# Patient Record
Sex: FEMALE | Race: WHITE | Employment: FULL TIME | URBAN - METROPOLITAN AREA
[De-identification: names, ages, dates, MRNs, and addresses within clinical notes are randomized per-mention and may not be internally consistent; named-entity substitution may affect disease eponyms.]

---

## 2017-01-18 ENCOUNTER — ED HISTORICAL/CONVERTED ENCOUNTER (OUTPATIENT)
Dept: OTHER | Age: 52
End: 2017-01-18

## 2017-08-04 ENCOUNTER — ED HISTORICAL/CONVERTED ENCOUNTER (OUTPATIENT)
Dept: OTHER | Age: 52
End: 2017-08-04

## 2017-08-14 ENCOUNTER — ED HISTORICAL/CONVERTED ENCOUNTER (OUTPATIENT)
Dept: OTHER | Age: 52
End: 2017-08-14

## 2018-09-09 ENCOUNTER — ED HISTORICAL/CONVERTED ENCOUNTER (OUTPATIENT)
Dept: OTHER | Age: 53
End: 2018-09-09

## 2019-08-11 ENCOUNTER — ED HISTORICAL/CONVERTED ENCOUNTER (OUTPATIENT)
Dept: OTHER | Age: 54
End: 2019-08-11

## 2019-08-19 ENCOUNTER — ED HISTORICAL/CONVERTED ENCOUNTER (OUTPATIENT)
Dept: OTHER | Age: 54
End: 2019-08-19

## 2019-12-15 ENCOUNTER — ED HISTORICAL/CONVERTED ENCOUNTER (OUTPATIENT)
Dept: OTHER | Age: 54
End: 2019-12-15

## 2021-01-03 ENCOUNTER — HOSPITAL ENCOUNTER (EMERGENCY)
Age: 56
Discharge: HOME | End: 2021-01-03
Attending: EMERGENCY MEDICINE
Payer: MEDICAID

## 2021-01-03 VITALS
WEIGHT: 147 LBS | TEMPERATURE: 98.4 F | RESPIRATION RATE: 18 BRPM | DIASTOLIC BLOOD PRESSURE: 69 MMHG | HEART RATE: 61 BPM | HEIGHT: 65 IN | OXYGEN SATURATION: 98 % | BODY MASS INDEX: 24.49 KG/M2 | SYSTOLIC BLOOD PRESSURE: 107 MMHG

## 2021-01-03 DIAGNOSIS — R31.9 URINARY TRACT INFECTION WITH HEMATURIA, SITE UNSPECIFIED: Primary | ICD-10-CM

## 2021-01-03 DIAGNOSIS — N39.0 URINARY TRACT INFECTION WITH HEMATURIA, SITE UNSPECIFIED: Primary | ICD-10-CM

## 2021-01-03 LAB
APPEARANCE UR: CLEAR
BACTERIA URNS QL MICRO: ABNORMAL /HPF
BILIRUB UR QL: NEGATIVE
COLOR UR: YELLOW
EPITH CASTS URNS QL MICRO: ABNORMAL /LPF
GLUCOSE UR STRIP.AUTO-MCNC: NEGATIVE MG/DL
HGB UR QL STRIP: ABNORMAL
KETONES UR QL STRIP.AUTO: NEGATIVE MG/DL
LEUKOCYTE ESTERASE UR QL STRIP.AUTO: ABNORMAL
NITRITE UR QL STRIP.AUTO: NEGATIVE
PH UR STRIP: 5 [PH] (ref 5–8)
PROT UR STRIP-MCNC: NEGATIVE MG/DL
RBC #/AREA URNS HPF: ABNORMAL /HPF (ref 0–5)
SP GR UR REFRACTOMETRY: 1.02 (ref 1–1.03)
UA: UC IF INDICATED,UAUC: ABNORMAL
UROBILINOGEN UR QL STRIP.AUTO: 1 EU/DL (ref 0.2–1)
WBC URNS QL MICRO: ABNORMAL /HPF (ref 0–4)

## 2021-01-03 PROCEDURE — 99283 EMERGENCY DEPT VISIT LOW MDM: CPT

## 2021-01-03 PROCEDURE — 81001 URINALYSIS AUTO W/SCOPE: CPT

## 2021-01-03 RX ORDER — SULFAMETHOXAZOLE AND TRIMETHOPRIM 800; 160 MG/1; MG/1
1 TABLET ORAL 2 TIMES DAILY
Qty: 20 TAB | Refills: 0 | Status: SHIPPED | OUTPATIENT
Start: 2021-01-03 | End: 2021-01-13

## 2021-01-03 RX ORDER — PHENAZOPYRIDINE HYDROCHLORIDE 200 MG/1
200 TABLET, FILM COATED ORAL 3 TIMES DAILY
Qty: 9 TAB | Refills: 0 | Status: SHIPPED | OUTPATIENT
Start: 2021-01-03 | End: 2021-01-06

## 2021-01-03 NOTE — ED PROVIDER NOTES
EMERGENCY DEPARTMENT HISTORY AND PHYSICAL EXAM      Date: 1/3/2021  Patient Name: Christina Nelson    History of Presenting Illness     Chief Complaint   Patient presents with    Bladder Infection       History Provided By: Patient    HPI: Christina Nelson, 50 y.o. female with a past medical history significant No significant past medical history presents to the ED with cc of Dysuria 1 week    There are no other complaints, changes, or physical findings at this time. PCP: None    No current facility-administered medications on file prior to encounter. No current outpatient medications on file prior to encounter. Past History     Past Medical History:  History reviewed. No pertinent past medical history. Past Surgical History:  History reviewed. No pertinent surgical history. Family History:  History reviewed. No pertinent family history. Social History:  Social History     Tobacco Use    Smoking status: Current Every Day Smoker     Packs/day: 1.00    Smokeless tobacco: Never Used   Substance Use Topics    Alcohol use: Never     Frequency: Never    Drug use: Never       Allergies:  No Known Allergies      Review of Systems     Review of Systems   Constitutional: Negative. HENT: Negative. Respiratory: Negative. Cardiovascular: Negative. Gastrointestinal: Negative. Genitourinary: Positive for dysuria and flank pain. Neurological: Negative. All other systems reviewed and are negative. Physical Exam     Physical Exam  Vitals signs and nursing note reviewed. Constitutional:       Appearance: Normal appearance. Neck:      Musculoskeletal: Normal range of motion and neck supple. Cardiovascular:      Rate and Rhythm: Normal rate and regular rhythm. Pulses: Normal pulses. Heart sounds: Normal heart sounds. Pulmonary:      Breath sounds: Normal breath sounds. Abdominal:      General: Abdomen is flat. Palpations: Abdomen is soft.    Musculoskeletal: Normal range of motion. Neurological:      General: No focal deficit present. Mental Status: She is alert and oriented to person, place, and time. Lab and Diagnostic Study Results     Labs -     Recent Results (from the past 12 hour(s))   URINALYSIS W/ REFLEX CULTURE    Collection Time: 01/03/21  2:00 PM    Specimen: Urine   Result Value Ref Range    Color Yellow      Appearance Clear Clear      Specific gravity 1.025 1.003 - 1.030      pH (UA) 5.0 5.0 - 8.0      Protein Negative Negative mg/dL    Glucose Negative Negative mg/dL    Ketone Negative Negative mg/dL    Bilirubin Negative Negative      Blood Small (A) Negative      Urobilinogen 1.0 0.2 - 1.0 EU/dL    Nitrites Negative Negative      Leukocyte Esterase Trace (A) Negative      WBC 5-10 0 - 4 /hpf    RBC 10-20 0 - 5 /hpf    Epithelial cells Moderate (A) Few /lpf    Bacteria 1+ (A) Negative /hpf    UA:UC IF INDICATED Culture not indicated by UA result Culture not indicated by UA result         Radiologic Studies -   @[email or web address]@  CT Results  (Last 48 hours)    None        CXR Results  (Last 48 hours)    None            Medical Decision Making   - I am the first provider for this patient. - I reviewed the vital signs, available nursing notes, past medical history, past surgical history, family history and social history. - Initial assessment performed. The patients presenting problems have been discussed, and they are in agreement with the care plan formulated and outlined with them. I have encouraged them to ask questions as they arise throughout their visit. Vital Signs-Reviewed the patient's vital signs. Patient Vitals for the past 12 hrs:   Temp Pulse Resp BP SpO2   01/03/21 1401 98.4 °F (36.9 °C) 61 18 107/69 98 %       Records Reviewed: Nursing Notes    The patient presents with       ED Course:          Provider Notes (Medical Decision Making):      MDM       Procedures   Medical Decision Makingedical Decision Making  Performed by: Wolfgang Fragoso MD  PROCEDURES:  Procedures       Disposition   Disposition: DC- Adult Discharges: All of the diagnostic tests were reviewed and questions answered. Diagnosis, care plan and treatment options were discussed. The patient understands the instructions and will follow up as directed. The patients results have been reviewed with them. They have been counseled regarding their diagnosis. The patient verbally convey understanding and agreement of the signs, symptoms, diagnosis, treatment and prognosis and additionally agrees to follow up as recommended with their PCP in 24 - 48 hours. They also agree with the care-plan and convey that all of their questions have been answered. I have also put together some discharge instructions for them that include: 1) educational information regarding their diagnosis, 2) how to care for their diagnosis at home, as well a 3) list of reasons why they would want to return to the ED prior to their follow-up appointment, should their condition change. Discharged    DISCHARGE PLAN:  1. There are no discharge medications for this patient. 2.   Follow-up Information     Follow up With Specialties Details Why Contact Info    Mark Guo MD Internal Medicine In 1 week  [address] [place] [address]  [phone]          3. Return to ED if worse   4. Current Discharge Medication List      START taking these medications    Details   trimethoprim-sulfamethoxazole (Bactrim DS) 160-800 mg per tablet Take 1 Tab by mouth two (2) times a day for 10 days. Qty: 20 Tab, Refills: 0      phenazopyridine (Pyridium) 200 mg tablet Take 1 Tab by mouth three (3) times daily for 3 days. Qty: 9 Tab, Refills: 0               Diagnosis     Clinical Impression:   1.  Urinary tract infection with hematuria, site unspecified        Attestations:    Wolfgang Fragoso MD    Please note that this dictation was completed with [employer], the [employer] voice recognition software. Quite often unanticipated grammatical, syntax, homophones, and other interpretive errors are inadvertently transcribed by the computer software. Please disregard these errors. Please excuse any errors that have escaped final proofreading. Thank you.

## 2021-04-16 ENCOUNTER — HOSPITAL ENCOUNTER (EMERGENCY)
Age: 56
Discharge: HOME | End: 2021-04-16
Attending: EMERGENCY MEDICINE
Payer: MEDICAID

## 2021-04-16 VITALS
TEMPERATURE: 97.6 F | BODY MASS INDEX: 25.23 KG/M2 | HEART RATE: 58 BPM | RESPIRATION RATE: 18 BRPM | HEIGHT: 66 IN | WEIGHT: 157 LBS | DIASTOLIC BLOOD PRESSURE: 71 MMHG | OXYGEN SATURATION: 98 % | SYSTOLIC BLOOD PRESSURE: 115 MMHG

## 2021-04-16 DIAGNOSIS — N39.0 URINARY TRACT INFECTION WITHOUT HEMATURIA, SITE UNSPECIFIED: Primary | ICD-10-CM

## 2021-04-16 DIAGNOSIS — M54.50 ACUTE MIDLINE LOW BACK PAIN WITHOUT SCIATICA: ICD-10-CM

## 2021-04-16 LAB
APPEARANCE UR: CLEAR
BACTERIA URNS QL MICRO: NEGATIVE /HPF
BILIRUB UR QL: NEGATIVE
COLOR UR: YELLOW
EPITH CASTS URNS QL MICRO: ABNORMAL /LPF
GLUCOSE UR STRIP.AUTO-MCNC: NEGATIVE MG/DL
HGB UR QL STRIP: ABNORMAL
KETONES UR QL STRIP.AUTO: NEGATIVE MG/DL
LEUKOCYTE ESTERASE UR QL STRIP.AUTO: ABNORMAL
NITRITE UR QL STRIP.AUTO: NEGATIVE
PH UR STRIP: 5 [PH] (ref 5–8)
PROT UR STRIP-MCNC: NEGATIVE MG/DL
RBC #/AREA URNS HPF: ABNORMAL /HPF (ref 0–5)
SP GR UR REFRACTOMETRY: 1.01 (ref 1–1.03)
UA: UC IF INDICATED,UAUC: ABNORMAL
UROBILINOGEN UR QL STRIP.AUTO: 0.1 EU/DL (ref 0.2–1)
WBC URNS QL MICRO: ABNORMAL /HPF (ref 0–4)

## 2021-04-16 PROCEDURE — 81001 URINALYSIS AUTO W/SCOPE: CPT

## 2021-04-16 PROCEDURE — 99283 EMERGENCY DEPT VISIT LOW MDM: CPT

## 2021-04-16 RX ORDER — CIPROFLOXACIN 500 MG/1
500 TABLET ORAL 2 TIMES DAILY
Qty: 14 TAB | Refills: 0 | Status: SHIPPED | OUTPATIENT
Start: 2021-04-16 | End: 2021-04-23

## 2021-04-16 NOTE — ED PROVIDER NOTES
EMERGENCY DEPARTMENT HISTORY AND PHYSICAL EXAM      Date: 4/16/2021  Patient Name: Aleksey Cobian    History of Presenting Illness     Chief Complaint   Patient presents with    Back Pain       History Provided By: Patient    HPI: Aleksey Cobian, 50 y.o. female with a past medical history significant UTI presents to the ED with cc of low back pain of insidious onset. Patient thinks that she may have another UTI. No fever or other constitutional symptoms. Pain is also worse with movement. There are no other complaints, changes, or physical findings at this time. PCP: None    No current facility-administered medications on file prior to encounter. No current outpatient medications on file prior to encounter. Past History     Past Medical History:  Past Medical History:   Diagnosis Date    Urinary tract infection        Past Surgical History:  History reviewed. No pertinent surgical history. Family History:  History reviewed. No pertinent family history. Social History:  Social History     Tobacco Use    Smoking status: Current Every Day Smoker     Packs/day: 1.00    Smokeless tobacco: Never Used   Substance Use Topics    Alcohol use: Never     Frequency: Never    Drug use: Never       Allergies:  No Known Allergies      Review of Systems     Review of Systems   Constitutional: Negative for diaphoresis and fatigue. HENT: Negative for congestion, dental problem, ear discharge and ear pain. Eyes: Negative for discharge and redness. Respiratory: Negative for cough, chest tightness and shortness of breath. Cardiovascular: Negative for chest pain and palpitations. Gastrointestinal: Negative for abdominal pain, constipation, diarrhea, nausea and vomiting. Endocrine: Negative. Genitourinary: Positive for dysuria. Negative for frequency. Musculoskeletal: Negative for arthralgias and myalgias. Skin: Negative.     Neurological: Negative for dizziness, syncope and light-headedness. Hematological: Negative. Psychiatric/Behavioral: Negative for agitation and behavioral problems. All other systems reviewed and are negative. Physical Exam     Physical Exam  Vitals signs and nursing note reviewed. Constitutional:       Appearance: Normal appearance. She is normal weight. HENT:      Head: Normocephalic and atraumatic. Nose: Nose normal.      Mouth/Throat:      Mouth: Mucous membranes are moist.      Pharynx: Oropharynx is clear. Eyes:      Extraocular Movements: Extraocular movements intact. Conjunctiva/sclera: Conjunctivae normal.      Pupils: Pupils are equal, round, and reactive to light. Neck:      Musculoskeletal: Normal range of motion and neck supple. Cardiovascular:      Rate and Rhythm: Normal rate and regular rhythm. Pulses: Normal pulses. Heart sounds: Normal heart sounds. Pulmonary:      Effort: Pulmonary effort is normal.      Breath sounds: Normal breath sounds. Abdominal:      General: Abdomen is flat. Palpations: Abdomen is soft. Musculoskeletal: Normal range of motion. Skin:     General: Skin is warm and dry. Capillary Refill: Capillary refill takes less than 2 seconds. Neurological:      General: No focal deficit present. Mental Status: She is alert and oriented to person, place, and time. Psychiatric:         Mood and Affect: Mood normal.         Behavior: Behavior normal.         Lab and Diagnostic Study Results     Labs -     Results for Pat Craven (MRN [id number]) as of 4/18/2021 07:34   Ref.  Range 4/16/2021 14:45   Color Latest Units:   Yellow   Appearance Latest Ref Range: Clear   Clear   Specific gravity Latest Ref Range: 1.003 - 1.030   1.015   pH (UA) Latest Ref Range: 5.0 - 8.0   5.0   Protein Latest Ref Range: Negative mg/dL Negative   Glucose Latest Ref Range: Negative mg/dL Negative   Ketone Latest Ref Range: Negative mg/dL Negative   Blood Latest Ref Range: Negative   Small (A)   Bilirubin Latest Ref Range: Negative   Negative   Urobilinogen Latest Ref Range: 0.2 - 1.0 EU/dL 0.1 (L)   Nitrites Latest Ref Range: Negative   Negative   Leukocyte Esterase Latest Ref Range: Negative   Trace (A)   Epithelial cells Latest Ref Range: Few /lpf Moderate (A)   WBC Latest Ref Range: 0 - 4 /hpf 5-10   RBC Latest Ref Range: 0 - 5 /hpf 0-5   Bacteria Latest Ref Range: Negative /hpf Negative     Radiologic Studies -     CT Results  (Last 48 hours)    None        CXR Results  (Last 48 hours)    None            Medical Decision Making   - I am the first provider for this patient. - I reviewed the vital signs, available nursing notes, past medical history, past surgical history, family history and social history. - Initial assessment performed. The patients presenting problems have been discussed, and they are in agreement with the care plan formulated and outlined with them. I have encouraged them to ask questions as they arise throughout their visit. Vital Signs-Reviewed the patient's vital signs. No data found. Records Reviewed: Nursing Notes    ED Course/Provider Notes (Medical Decision Making): Uneventful ED course, clinical improvement with therapy, patient will be discharged to followup with PCP as directed    Disposition     Disposition: Condition stable and improved  DC- Adult Discharges: All of the diagnostic tests were reviewed and questions answered. Diagnosis, care plan and treatment options were discussed. The patient understands the instructions and will follow up as directed. The patients results have been reviewed with them. They have been counseled regarding their diagnosis. The patient verbally convey understanding and agreement of the signs, symptoms, diagnosis, treatment and prognosis and additionally agrees to follow up as recommended with their PCP in 24 - 48 hours. They also agree with the care-plan and convey that all of their questions have been answered.   I have also put together some discharge instructions for them that include: 1) educational information regarding their diagnosis, 2) how to care for their diagnosis at home, as well a 3) list of reasons why they would want to return to the ED prior to their follow-up appointment, should their condition change. Discharged    DISCHARGE PLAN:  1. There are no discharge medications for this patient. 2.   Follow-up Information     Follow up With Specialties Details Why Contact Info    Follow-up with PCP of your choice  In 2 days          3. Return to ED if worse   4. Discharge Medication List as of 4/16/2021  4:28 PM            Diagnosis     Clinical Impression:   1. Urinary tract infection without hematuria, site unspecified    2. Acute midline low back pain without sciatica        Attestations:    Sofía Sidhu MD    Please note that this dictation was completed with [id number], the computer voice recognition software. Quite often unanticipated grammatical, syntax, homophones, and other interpretive errors are inadvertently transcribed by the computer software. Please disregard these errors. Please excuse any errors that have escaped final proofreading. Thank you.

## 2021-04-16 NOTE — LETTER
[address] [place] [address] 
[phone] Work/School Note Date: 4/16/2021 To Whom It May concern: 
 
Violet Baptiste was seen and treated today in the emergency room by the following provider(s): 
Attending Provider: Rikki Blakely MD.   
 
Violet Baptiste may return to work on Monday, April 19, 2021. Sincerely, 
 
 
Dr. Nuris Rand.  Shaheen Fent

## 2021-04-16 NOTE — DISCHARGE INSTRUCTIONS
Take medicines as prescribed and follow-up with your PCP in 3 to 5 days. Return to emergency room for any new or worsening symptoms. Thank you! Thank you for allowing me to care for you in the emergency department. I sincerely hope that you are satisfied with your visit today. It is my goal to provide you with excellent care. Below you will find a list of your labs and imaging from your visit today. Should you have any questions regarding these results please do not hesitate to call the emergency department. Labs -     Recent Results (from the past 12 hour(s))   URINALYSIS W/ REFLEX CULTURE    Collection Time: 04/16/21  2:45 PM    Specimen: Urine   Result Value Ref Range    Color Yellow      Appearance Clear Clear      Specific gravity 1.015 1.003 - 1.030      pH (UA) 5.0 5.0 - 8.0      Protein Negative Negative mg/dL    Glucose Negative Negative mg/dL    Ketone Negative Negative mg/dL    Bilirubin Negative Negative      Blood Small (A) Negative      Urobilinogen 0.1 (L) 0.2 - 1.0 EU/dL    Nitrites Negative Negative      Leukocyte Esterase Trace (A) Negative      WBC 5-10 0 - 4 /hpf    RBC 0-5 0 - 5 /hpf    Epithelial cells Moderate (A) Few /lpf    Bacteria Negative Negative /hpf    UA:UC IF INDICATED Culture not indicated by UA result Culture not indicated by UA result         Radiologic Studies -   No orders to display     CT Results  (Last 48 hours)      None          CXR Results  (Last 48 hours)      None               If you feel that you have not received excellent quality care or timely care, please ask to speak to the nurse manager. Please choose us in the future for your continued health care needs. ------------------------------------------------------------------------------------------------------------  The exam and treatment you received in the Emergency Department were for an urgent problem and are not intended as complete care.  It is important that you follow-up with a doctor, nurse practitioner, or physician assistant to:  (1) confirm your diagnosis,  (2) re-evaluation of changes in your illness and treatment, and  (3) for ongoing care. If your symptoms become worse or you do not improve as expected and you are unable to reach your usual health care provider, you should return to the Emergency Department. We are available 24 hours a day. Please take your discharge instructions with you when you go to your follow-up appointment. If you have any problem arranging a follow-up appointment, contact the Emergency Department immediately. If a prescription has been provided, please have it filled as soon as possible to prevent a delay in treatment. Read the entire medication instruction sheet provided to you by the pharmacy. If you have any questions or reservations about taking the medication due to side effects or interactions with other medications, please call your primary care physician or contact the ER to speak with the charge nurse. Make an appointment with your family doctor or the physician you were referred to for follow-up of this visit as instructed on your discharge paperwork, as this is a mandatory follow-up. Return to the ER if you are unable to be seen or if you are unable to be seen in a timely manner. If you have any problem arranging the follow-up visit, contact the Emergency Department immediately.

## 2021-05-01 ENCOUNTER — APPOINTMENT (OUTPATIENT)
Dept: MRI IMAGING | Age: 56
End: 2021-05-01
Attending: INTERNAL MEDICINE
Payer: MEDICAID

## 2021-05-01 ENCOUNTER — APPOINTMENT (OUTPATIENT)
Dept: CT IMAGING | Age: 56
End: 2021-05-01
Attending: EMERGENCY MEDICINE
Payer: MEDICAID

## 2021-05-01 ENCOUNTER — HOSPITAL ENCOUNTER (OUTPATIENT)
Age: 56
Setting detail: OBSERVATION
Discharge: HOME | End: 2021-05-02
Attending: EMERGENCY MEDICINE | Admitting: INTERNAL MEDICINE
Payer: MEDICAID

## 2021-05-01 DIAGNOSIS — G45.9 TIA (TRANSIENT ISCHEMIC ATTACK): ICD-10-CM

## 2021-05-01 DIAGNOSIS — R29.898 LEFT ARM WEAKNESS: ICD-10-CM

## 2021-05-01 DIAGNOSIS — R20.0 LEFT FACIAL NUMBNESS: Primary | ICD-10-CM

## 2021-05-01 LAB
ALBUMIN SERPL-MCNC: 3.9 G/DL (ref 3.5–5)
ALBUMIN/GLOB SERPL: 1.2 {RATIO} (ref 1.1–2.2)
ALP SERPL-CCNC: 72 U/L (ref 45–117)
ALT SERPL-CCNC: 25 U/L (ref 12–78)
ANION GAP SERPL CALC-SCNC: 3 MMOL/L (ref 5–15)
APTT PPP: 27.9 SEC (ref 21.2–34.1)
AST SERPL W P-5'-P-CCNC: 11 U/L (ref 15–37)
BASOPHILS # BLD: 0 K/UL (ref 0–0.1)
BASOPHILS NFR BLD: 0 % (ref 0–1)
BILIRUB SERPL-MCNC: 0.6 MG/DL (ref 0.2–1)
BUN SERPL-MCNC: 17 MG/DL (ref 6–20)
BUN/CREAT SERPL: 21 (ref 12–20)
CA-I BLD-MCNC: 9 MG/DL (ref 8.5–10.1)
CHLORIDE SERPL-SCNC: 110 MMOL/L (ref 97–108)
CO2 SERPL-SCNC: 26 MMOL/L (ref 21–32)
CREAT SERPL-MCNC: 0.8 MG/DL (ref 0.55–1.02)
D DIMER PPP FEU-MCNC: 0.51 UG/ML(FEU)
DIFFERENTIAL METHOD BLD: NORMAL
EOSINOPHIL # BLD: 0 K/UL (ref 0–0.4)
EOSINOPHIL NFR BLD: 1 % (ref 0–7)
ERYTHROCYTE [DISTWIDTH] IN BLOOD BY AUTOMATED COUNT: 12.1 % (ref 11.5–14.5)
GLOBULIN SER CALC-MCNC: 3.2 G/DL (ref 2–4)
GLUCOSE BLD STRIP.AUTO-MCNC: 93 MG/DL (ref 65–100)
GLUCOSE SERPL-MCNC: 86 MG/DL (ref 65–100)
HCT VFR BLD AUTO: 37.6 % (ref 35–47)
HGB BLD-MCNC: 12.5 G/DL (ref 11.5–16)
IMM GRANULOCYTES # BLD AUTO: 0 K/UL (ref 0–0.04)
IMM GRANULOCYTES NFR BLD AUTO: 0 % (ref 0–0.5)
INR PPP: 1 (ref 0.9–1.1)
LYMPHOCYTES # BLD: 2.1 K/UL (ref 0.8–3.5)
LYMPHOCYTES NFR BLD: 38 % (ref 12–49)
MCH RBC QN AUTO: 31.6 PG (ref 26–34)
MCHC RBC AUTO-ENTMCNC: 33.2 G/DL (ref 30–36.5)
MCV RBC AUTO: 95.2 FL (ref 80–99)
MONOCYTES # BLD: 0.3 K/UL (ref 0–1)
MONOCYTES NFR BLD: 5 % (ref 5–13)
NEUTS SEG # BLD: 3.1 K/UL (ref 1.8–8)
NEUTS SEG NFR BLD: 56 % (ref 32–75)
PERFORMED BY, TECHID: NORMAL
PLATELET # BLD AUTO: 240 K/UL (ref 150–400)
PMV BLD AUTO: 10.9 FL (ref 8.9–12.9)
POTASSIUM SERPL-SCNC: 3.7 MMOL/L (ref 3.5–5.1)
PROT SERPL-MCNC: 7.1 G/DL (ref 6.4–8.2)
PROTHROMBIN TIME: 13.3 SEC (ref 11.9–14.7)
RBC # BLD AUTO: 3.95 M/UL (ref 3.8–5.2)
SODIUM SERPL-SCNC: 139 MMOL/L (ref 136–145)
THERAPEUTIC RANGE,PTTT: NORMAL SEC (ref 82–109)
TROPONIN I SERPL-MCNC: <0.05 NG/ML
TROPONIN I SERPL-MCNC: <0.05 NG/ML
WBC # BLD AUTO: 5.5 K/UL (ref 3.6–11)

## 2021-05-01 PROCEDURE — 36415 COLL VENOUS BLD VENIPUNCTURE: CPT

## 2021-05-01 PROCEDURE — 99218 HC RM OBSERVATION: CPT

## 2021-05-01 PROCEDURE — 93005 ELECTROCARDIOGRAM TRACING: CPT

## 2021-05-01 PROCEDURE — 74011250637 HC RX REV CODE- 250/637: Performed by: INTERNAL MEDICINE

## 2021-05-01 PROCEDURE — 85025 COMPLETE CBC W/AUTO DIFF WBC: CPT

## 2021-05-01 PROCEDURE — 96372 THER/PROPH/DIAG INJ SC/IM: CPT

## 2021-05-01 PROCEDURE — 85610 PROTHROMBIN TIME: CPT

## 2021-05-01 PROCEDURE — 80053 COMPREHEN METABOLIC PANEL: CPT

## 2021-05-01 PROCEDURE — 92610 EVALUATE SWALLOWING FUNCTION: CPT

## 2021-05-01 PROCEDURE — 74011250636 HC RX REV CODE- 250/636: Performed by: INTERNAL MEDICINE

## 2021-05-01 PROCEDURE — 85379 FIBRIN DEGRADATION QUANT: CPT

## 2021-05-01 PROCEDURE — 70551 MRI BRAIN STEM W/O DYE: CPT

## 2021-05-01 PROCEDURE — 85730 THROMBOPLASTIN TIME PARTIAL: CPT

## 2021-05-01 PROCEDURE — 99284 EMERGENCY DEPT VISIT MOD MDM: CPT

## 2021-05-01 PROCEDURE — 82962 GLUCOSE BLOOD TEST: CPT

## 2021-05-01 PROCEDURE — 70450 CT HEAD/BRAIN W/O DYE: CPT

## 2021-05-01 PROCEDURE — 84484 ASSAY OF TROPONIN QUANT: CPT

## 2021-05-01 RX ORDER — ACETAMINOPHEN 650 MG/1
650 SUPPOSITORY RECTAL
Status: DISCONTINUED | OUTPATIENT
Start: 2021-05-01 | End: 2021-05-02 | Stop reason: HOSPADM

## 2021-05-01 RX ORDER — ACETAMINOPHEN 325 MG/1
650 TABLET ORAL
Status: DISCONTINUED | OUTPATIENT
Start: 2021-05-01 | End: 2021-05-02 | Stop reason: HOSPADM

## 2021-05-01 RX ORDER — GUAIFENESIN 100 MG/5ML
81 LIQUID (ML) ORAL DAILY
Status: DISCONTINUED | OUTPATIENT
Start: 2021-05-01 | End: 2021-05-02 | Stop reason: HOSPADM

## 2021-05-01 RX ORDER — ENOXAPARIN SODIUM 100 MG/ML
40 INJECTION SUBCUTANEOUS EVERY 24 HOURS
Status: DISCONTINUED | OUTPATIENT
Start: 2021-05-01 | End: 2021-05-02 | Stop reason: HOSPADM

## 2021-05-01 RX ADMIN — ASPIRIN 81 MG: 81 TABLET, CHEWABLE ORAL at 11:37

## 2021-05-01 RX ADMIN — ENOXAPARIN SODIUM 40 MG: 40 INJECTION SUBCUTANEOUS at 11:37

## 2021-05-01 NOTE — PROGRESS NOTES
Patient refused dual admission skin assessment. Patient denies injury to skin of any kind. No injuries visible upon clothed inspection.

## 2021-05-01 NOTE — H&P
History & Physical    Primary Care Provider: None  Source of Information: Patient/family     History of Presenting Illness:   Bob Toney is a 50 y.o. female who developed a sharp left upper chest pain and pressure last night which went up into her shoulder, left arm and face. It only lasted a couple minutes then resolved. When she woke up this morning she was okay but then she began having left-sided facial numbness at around 8:00 this morning while at work. She also noted some weakness in her left arm and was having trouble carrying a tray. She does note she has had this intermittently in the past as well. She denies shortness of breath    Patient does note a lot of anxiety and stress recently       Review of Systems:  A comprehensive review of systems was negative except for that written in the History of Present Illness. Past Medical History:   Diagnosis Date    Urinary tract infection         History reviewed. No pertinent surgical history.     Prior to Admission medications    Not on File       No Known Allergies     Family History   Problem Relation Age of Onset    No Known Problems Mother     No Known Problems Father         Social History     Socioeconomic History    Marital status:      Spouse name: Not on file    Number of children: Not on file    Years of education: Not on file    Highest education level: Not on file   Occupational History    Occupation:      Comment: [employer]   Tobacco Use    Smoking status: Current Every Day Smoker     Packs/day: 0.50    Smokeless tobacco: Never Used   Substance and Sexual Activity    Alcohol use: Never     Frequency: Never    Drug use: Never    Sexual activity: Not Currently   Other Topics Concern            CODE STATUS:  DNR    Full    Other      Objective:     Physical Exam:     Visit Vitals  /86   Pulse (!) 51   Temp 98.2 °F (36.8 °C)   Resp 18   Ht 5' 6\" (1.676 m)   Wt 70.8 kg (156 lb) SpO2 98%   BMI 25.18 kg/m²      O2 Device: None (Room air)    General:  Alert, cooperative, no distress, appears stated age. Head:  Normocephalic, without obvious abnormality, atraumatic. Eyes:  Conjunctivae/corneas clear. PERRL, EOMs intact. Nose: Nares normal. Septum midline. Mucosa normal. No drainage or sinus tenderness. Throat: Lips, mucosa, and tongue normal. Teeth and gums normal.   Neck: Supple, symmetrical, trachea midline, no adenopathy, thyroid: no enlargement/tenderness/nodules, no carotid bruit and no JVD. Back:   Symmetric, no curvature. ROM normal. No CVA tenderness. Lungs:   Clear to auscultation bilaterally. Chest wall:  No tenderness or deformity. Heart:  Regular rate and rhythm, S1, S2 normal, no murmur, click, rub or gallop. Abdomen:   Soft, non-tender. Bowel sounds normal. No masses,  No organomegaly. Extremities: Extremities normal, atraumatic, no cyanosis or edema. Pulses: 2+ and symmetric all extremities. Skin: Skin color, texture, turgor normal. No rashes or lesions   Neurologic: CNII-XII intact. No motor or sensory deficits. 24 Hour Results:    Recent Results (from the past 24 hour(s))   GLUCOSE, POC    Collection Time: 05/01/21  9:16 AM   Result Value Ref Range    Glucose (POC) 93 65 - 100 mg/dL    Performed by Kobi Dhillon    CBC WITH AUTOMATED DIFF    Collection Time: 05/01/21  9:20 AM   Result Value Ref Range    WBC 5.5 3.6 - 11.0 K/uL    RBC 3.95 3.80 - 5.20 M/uL    HGB 12.5 11.5 - 16.0 g/dL    HCT 37.6 35.0 - 47.0 %    MCV 95.2 80.0 - 99.0 FL    MCH 31.6 26.0 - 34.0 PG    MCHC 33.2 30.0 - 36.5 g/dL    RDW 12.1 11.5 - 14.5 %    PLATELET 828 746 - 503 K/uL    MPV 10.9 8.9 - 12.9 FL    NEUTROPHILS 56 32 - 75 %    LYMPHOCYTES 38 12 - 49 %    MONOCYTES 5 5 - 13 %    EOSINOPHILS 1 0 - 7 %    BASOPHILS 0 0 - 1 %    IMMATURE GRANULOCYTES 0 0.0 - 0.5 %    ABS. NEUTROPHILS 3.1 1.8 - 8.0 K/UL    ABS. LYMPHOCYTES 2.1 0.8 - 3.5 K/UL    ABS.  MONOCYTES 0.3 0.0 - 1.0 K/UL    ABS. EOSINOPHILS 0.0 0.0 - 0.4 K/UL    ABS. BASOPHILS 0.0 0.0 - 0.1 K/UL    ABS. IMM. GRANS. 0.0 0.00 - 0.04 K/UL    DF AUTOMATED     METABOLIC PANEL, COMPREHENSIVE    Collection Time: 05/01/21  9:20 AM   Result Value Ref Range    Sodium 139 136 - 145 mmol/L    Potassium 3.7 3.5 - 5.1 mmol/L    Chloride 110 (H) 97 - 108 mmol/L    CO2 26 21 - 32 mmol/L    Anion gap 3 (L) 5 - 15 mmol/L    Glucose 86 65 - 100 mg/dL    BUN 17 6 - 20 mg/dL    Creatinine 0.80 0.55 - 1.02 mg/dL    BUN/Creatinine ratio 21 (H) 12 - 20      GFR est AA >60 >60 ml/min/1.73m2    GFR est non-AA >60 >60 ml/min/1.73m2    Calcium 9.0 8.5 - 10.1 mg/dL    Bilirubin, total 0.6 0.2 - 1.0 mg/dL    AST (SGOT) 11 (L) 15 - 37 U/L    ALT (SGPT) 25 12 - 78 U/L    Alk. phosphatase 72 45 - 117 U/L    Protein, total 7.1 6.4 - 8.2 g/dL    Albumin 3.9 3.5 - 5.0 g/dL    Globulin 3.2 2.0 - 4.0 g/dL    A-G Ratio 1.2 1.1 - 2.2     PROTHROMBIN TIME + INR    Collection Time: 05/01/21  9:20 AM   Result Value Ref Range    Prothrombin time 13.3 11.9 - 14.7 sec    INR 1.0 0.9 - 1.1     PTT    Collection Time: 05/01/21  9:20 AM   Result Value Ref Range    aPTT 27.9 21.2 - 34.1 sec    aPTT, therapeutic range   82 - 109 sec   TROPONIN I    Collection Time: 05/01/21  9:20 AM   Result Value Ref Range    Troponin-I, Qt. <0.05 <0.05 ng/mL         Imaging:   CT CODE NEURO HEAD WO CONTRAST   Final Result   No acute intracranial process. Results called to Joshua Santocharmaine Carrizalesheather on 5/1/2021 9:31 AM.      MRI BRAIN W CONT    (Results Pending)           Assessment:   Transient left-sided facial numbness and left arm weakness, consider although doubt true TIA    Atypical chest pain, likely musculoskeletal.  Heart score is only 1    Tobacco abuse      Plan:    We will admit as observation to medical telemetry  Recheck second troponin along with a D-dimer  Obtain MRI brain, carotid duplex and echocardiogram  Start aspirin  Neurology consult  Smoking cessation advised    Full CODE STATUS      Patient is on no home medications    Signed By: Jessica Loza MD     May 1, 2021

## 2021-05-01 NOTE — PROGRESS NOTES
Spiritual Care Assessment/Progress Note  [facility]      NAME: Maribel Flores      MRN: [id number]  AGE: 50 y.o. SEX: female  Congregation Affiliation: Unknown   Language: English     5/1/2021     Total Time (in minutes): 15     Spiritual Assessment begun in [facility] EM[facility]PT through conversation with:         [x]Patient        [] Family    [] Friend(s)        Reason for Consult:  Other (comment)(Stroke Alert)     Spiritual beliefs: (Please include comment if needed)     [] Identifies with a jeovany tradition:         [] Supported by a jeovany community:            [] Claims no spiritual orientation:           [] Seeking spiritual identity:                [x] Adheres to an individual form of spirituality:           [] Not able to assess:                           Identified resources for coping:      [] Prayer                               [] Music                  [] Guided Imagery     [x] Family/friends                 [] Pet visits     [] Devotional reading                         [] Unknown     [] Other:                                               Interventions offered during this visit: (See comments for more details)    Patient Interventions: Affirmation of jeovany, Affirmation of emotions/emotional suffering, Catharsis/review of pertinent events in supportive environment, Coping skills reviewed/reinforced, Initial visit, Normalization of emotional/spiritual concerns, Prayer (assurance of)           Plan of Care:     [] Support spiritual and/or cultural needs    [] Support AMD and/or advance care planning process      [] Support grieving process   [] Coordinate Rites and/or Rituals    [] Coordination with community clergy   [] No spiritual needs identified at this time   [] Detailed Plan of Care below (See Comments)  [] Make referral to Music Therapy  [] Make referral to Pet Therapy     [] Make referral to Addiction services  [] Make referral to [facility]  [] Make referral to Spiritual Care Partner  [] No future visits requested        [x] Follow up upon further referrals     Comments: The purpose of the visit was in response to a Stroke Alert. The patient mentioned feeling comforted by staff. She expressed that she felt some heaviness in her chest and she became frightened. She mentioned feeling supported by her  who brought her to the hospital. She expressed trusting God during this process. The  provided the ministry of presence and the comfort of spiritual care. [address] [facility] Miah Deluna.    can be reached by calling the  at [facility]  [phone]

## 2021-05-01 NOTE — ED NOTES
Bedside and Verbal shift change report given to [address] [place] [address]  (oncoming nurse) by Andres Gutierrez (offgoing nurse). Report included the following information SBAR, Kardex, ED Summary, MAR, Recent Results and Cardiac Rhythm NSR.  \

## 2021-05-01 NOTE — PROGRESS NOTES
SPEECH LANGUAGE PATHOLOGY BEDSIDE SWALLOW EVALUATIONS  Patient: Ambika Trujillo (69 y.o. female)  Date: 5/1/2021  Primary Diagnosis: TIA (transient ischemic attack) [G45.9]       Precautions: TIA      Based on the objective data described below, the patient presents with no swallowing issues. Her swallow is within functional limits. Pt positioned upright in bed, alert, and agreeable to swallow evaluation with her daughter present. Her daughter came in with [employer], but also had a pureed tray at bedside. Pt. Refused lunch tray and clinician was able to assess swallow with her meal from [employer]. Pt. tolerated bites of the cheeseburger, fries and sips of sweet tea via straw without difficulty. Good manipulation bolus and timely swallows noted. Pharyngeal phrase WFL. Good HLE via digital palpation. Overall, no overt s/sx aspiration. PLAN :  Recommendations and Planned Interventions:  Start regular diet with thin liquids. Swallow is within functional limits. No further skilled ST services are warranted at this time. Discharge Recommendations: None     SUBJECTIVE:   Patient I'm having no issues with my swallow. OBJECTIVE:     CXR Results  (Last 48 hours)    None         CT Results  (Last 48 hours)               05/01/21 0910  CT CODE NEURO HEAD WO CONTRAST Final result    Impression:  No acute intracranial process. Results called to Joshua Carrizalesciara on 5/1/2021 9:31 AM.       Narrative:  Dose Reduction:    All CT scans at this facility are performed using dose reduction optimization   techniques as appropriate to a performed exam including the following: Automated   exposure control, adjustments of the mA and/or kV according to patient size, or   use of iterative reconstruction technique. Clinical data: Left facial numbness       Findings: Unenhanced images were obtained, compared with 5/20/2015.    Subcentimeter hypodensity noted, anterior limb left internal capsule abutting   the left caudate head, consistent with remote lacunar infarct, similar to   previous. No evidence of acute large vessel distribution infarct. No evidence of   brain mass or mass effect. No acute intracranial hemorrhage. Normal gray-white   differentiation. No abnormal extra-axial fluid collection. No abnormality is   seen involving the corpus callosum, craniovertebral junction, or pituitary   gland. Past Medical History:   Diagnosis Date    Urinary tract infection    History reviewed. No pertinent surgical history. Prior Level of Function/Home Situation: Independent  Home Situation  Home Environment: Private residence  # Steps to Enter: 5  One/Two Story Residence: Two story  Living Alone: No  Support Systems: Family member(s)  Patient Expects to be Discharged to[de-identified] Private residence  Current DME Used/Available at Home: None  Diet prior to admission: Regular  Current Diet:  NPO   Cognitive and Communication Status:  Neurologic State: Alert  Orientation Level: Oriented X4  Cognition: Appropriate decision making, Appropriate for age attention/concentration, Appropriate safety awareness, Follows commands             Pain:  Pain Scale 1: Numeric (0 - 10)  Pain Intensity 1: 0       After treatment:   Patient left in no apparent distress sitting up in chair, Call bell within reach, Nursing notified and Caregiver / family present    COMMUNICATION/EDUCATION:   Patient was educated regarding purpose of SLP assessment and understood that her swallow was in functional limits; requiring no further skilled ST needs. Patient was in agreement.       Thank you for this referral.  Jerilyn Dior M.S. CCC-SLP

## 2021-05-01 NOTE — ED NOTES
TRANSFER - OUT REPORT:    Verbal report given to Pedro(name) on [facility] [place] [facility]  being transferred to [facility] (unit) for routine progression of care       Report consisted of patients Situation, Background, Assessment and   Recommendations(SBAR). Information from the following report(s) SBAR, Kardex, ED Summary and Cardiac Rhythm sinus bradycardia was reviewed with the receiving nurse. Lines:   Peripheral IV 05/01/21 (Active)        Opportunity for questions and clarification was provided.       Patient transported with:   Monitor

## 2021-05-01 NOTE — ED TRIAGE NOTES
Patient reports left sided chest pain/pressure since last night that radiated to left arm. Patient reports that improved but at 0800 began having left sided facial numbness with decreased sensation to left side of face. Level one stroke alert called from triage.

## 2021-05-02 ENCOUNTER — APPOINTMENT (OUTPATIENT)
Dept: NON INVASIVE DIAGNOSTICS | Age: 56
End: 2021-05-02
Attending: INTERNAL MEDICINE
Payer: MEDICAID

## 2021-05-02 VITALS
BODY MASS INDEX: 25.07 KG/M2 | OXYGEN SATURATION: 97 % | RESPIRATION RATE: 18 BRPM | HEIGHT: 66 IN | DIASTOLIC BLOOD PRESSURE: 82 MMHG | HEART RATE: 52 BPM | TEMPERATURE: 97.6 F | WEIGHT: 156 LBS | SYSTOLIC BLOOD PRESSURE: 131 MMHG

## 2021-05-02 LAB
ATRIAL RATE: 50 BPM
CALCULATED P AXIS, ECG09: 76 DEGREES
CALCULATED R AXIS, ECG10: 42 DEGREES
CALCULATED T AXIS, ECG11: 46 DEGREES
CHOLEST SERPL-MCNC: 155 MG/DL
DIAGNOSIS, 93000: NORMAL
ECHO AR MAX VEL PISA: 203 CM/S
ECHO AV PEAK GRADIENT: 8 MMHG
ECHO AV REGURGITANT PHT: 764 MS
ECHO EST RA PRESSURE: 3 MMHG
ECHO LV E' SEPTAL VELOCITY: 7.9 CM/S
ECHO LV EDV A2C: 81.7 CM3
ECHO LV EJECTION FRACTION A2C: 37 %
ECHO LV EJECTION FRACTION BIPLANE: 74.8 % (ref 55–100)
ECHO LV ESV A2C: 20.6 CM3
ECHO LV INTERNAL DIMENSION DIASTOLIC: 4.34 CM (ref 3.9–5.3)
ECHO LV INTERNAL DIMENSION SYSTOLIC: 2.74 CM
ECHO LV IVSD: 1.08 CM (ref 0.6–0.9)
ECHO LV MASS 2D: 152.7 G (ref 67–162)
ECHO LV MASS INDEX 2D: 84.9 G/M2 (ref 43–95)
ECHO LV POSTERIOR WALL DIASTOLIC: 1 CM (ref 0.6–0.9)
ECHO LVOT PEAK GRADIENT: 3 MMHG
ECHO MV A VELOCITY: 65.4 CM/S
ECHO MV E DECELERATION TIME (DT): 141 MS
ECHO MV E VELOCITY: 70.5 CM/S
ECHO MV E/A RATIO: 1.08
ECHO MV E/E' SEPTAL: 8.92
ECHO PV PEAK INSTANTANEOUS GRADIENT SYSTOLIC: 3 MMHG
ECHO PV REGURGITANT MAX VELOCITY: 142 CM/S
ECHO PV REGURGITANT MAX VELOCITY: 85.8 CM/S
ECHO PV REGURGITANT MAX VELOCITY: 90.5 CM/S
ECHO PVEIN A DURATION: 67 MS
ECHO PVEIN A VELOCITY: 31.3 CM/S
ECHO RA AREA 4C: 14.74 CM2
ECHO RIGHT VENTRICULAR SYSTOLIC PRESSURE (RVSP): 17 MMHG
ECHO RV INTERNAL DIMENSION: 3.1 CM
ECHO TV MAX VELOCITY: 189 CM/S
ECHO TV MAX VELOCITY: 189 CM/S
ECHO TV REGURGITANT PEAK GRADIENT: 14 MMHG
HDLC SERPL-MCNC: 46 MG/DL
HDLC SERPL: 3.4 {RATIO} (ref 0–5)
LDLC SERPL CALC-MCNC: 93.6 MG/DL (ref 0–100)
LEFT CCA DIST DIAS: 23.5 CM/S
LEFT CCA DIST SYS: 69.4 CM/S
LEFT CCA PROX DIAS: 21.1 CM/S
LEFT CCA PROX SYS: 72.4 CM/S
LEFT ECA DIAS: 19.9 CM/S
LEFT ECA SYS: 68.8 CM/S
LEFT ICA DIST DIAS: 37.4 CM/S
LEFT ICA DIST SYS: 95.3 CM/S
LEFT ICA PROX DIAS: 32 CM/S
LEFT ICA PROX SYS: 89.3 CM/S
LEFT SUBCLAVIAN DIAS: 4.82 CM/S
LEFT SUBCLAVIAN SYS: 82.6 CM/S
LEFT VERTEBRAL DIAS: 24.7 CM/S
LEFT VERTEBRAL SYS: 83.2 CM/S
LIPID PROFILE,FLP: NORMAL
P-R INTERVAL, ECG05: 180 MS
Q-T INTERVAL, ECG07: 460 MS
QRS DURATION, ECG06: 86 MS
QTC CALCULATION (BEZET), ECG08: 419 MS
RIGHT CCA DIST DIAS: 24.7 CM/S
RIGHT CCA DIST SYS: 73.6 CM/S
RIGHT CCA PROX DIAS: 23.7 CM/S
RIGHT CCA PROX SYS: 133 CM/S
RIGHT ECA DIAS: 20.5 CM/S
RIGHT ECA SYS: 74.8 CM/S
RIGHT ICA DIST DIAS: 35.9 CM/S
RIGHT ICA DIST SYS: 58.8 CM/S
RIGHT ICA PROX DIAS: 33.2 CM/S
RIGHT ICA PROX SYS: 80.2 CM/S
RIGHT SUBCLAVIAN DIAS: 9.17 CM/S
RIGHT SUBCLAVIAN SYS: 80.2 CM/S
RIGHT VERTEBRAL DIAS: 22.3 CM/S
RIGHT VERTEBRAL SYS: 68.2 CM/S
TRIGL SERPL-MCNC: 77 MG/DL (ref ?–150)
VENTRICULAR RATE, ECG03: 50 BPM
VLDLC SERPL CALC-MCNC: 15.4 MG/DL

## 2021-05-02 PROCEDURE — 96372 THER/PROPH/DIAG INJ SC/IM: CPT

## 2021-05-02 PROCEDURE — 74011250636 HC RX REV CODE- 250/636: Performed by: INTERNAL MEDICINE

## 2021-05-02 PROCEDURE — 80061 LIPID PANEL: CPT

## 2021-05-02 PROCEDURE — 97161 PT EVAL LOW COMPLEX 20 MIN: CPT

## 2021-05-02 PROCEDURE — 99218 HC RM OBSERVATION: CPT

## 2021-05-02 PROCEDURE — 93880 EXTRACRANIAL BILAT STUDY: CPT

## 2021-05-02 PROCEDURE — 36415 COLL VENOUS BLD VENIPUNCTURE: CPT

## 2021-05-02 PROCEDURE — 74011250637 HC RX REV CODE- 250/637: Performed by: INTERNAL MEDICINE

## 2021-05-02 PROCEDURE — 93306 TTE W/DOPPLER COMPLETE: CPT

## 2021-05-02 PROCEDURE — 97165 OT EVAL LOW COMPLEX 30 MIN: CPT

## 2021-05-02 RX ORDER — ATORVASTATIN CALCIUM 40 MG/1
40 TABLET, FILM COATED ORAL DAILY
Qty: 30 TAB | Refills: 0 | Status: SHIPPED | OUTPATIENT
Start: 2021-05-02

## 2021-05-02 RX ORDER — GUAIFENESIN 100 MG/5ML
81 LIQUID (ML) ORAL DAILY
Qty: 30 TAB | Refills: 0 | Status: SHIPPED
Start: 2021-05-03

## 2021-05-02 RX ADMIN — ENOXAPARIN SODIUM 40 MG: 40 INJECTION SUBCUTANEOUS at 09:53

## 2021-05-02 RX ADMIN — ACETAMINOPHEN 650 MG: 325 TABLET, FILM COATED ORAL at 05:52

## 2021-05-02 RX ADMIN — ASPIRIN 81 MG: 81 TABLET, CHEWABLE ORAL at 09:00

## 2021-05-02 NOTE — PROGRESS NOTES
At 12:30 patient discharged home to self care. Follow up appointment with Dr. Leeroy Braxton week will be made tomorrow during office hours. ........ Samuel Villarreal Discharge plan of care/case management plan validated with provider discharge order.

## 2021-05-02 NOTE — DISCHARGE SUMMARY
Physician Discharge Summary     Patient ID:    Aleksey Cobian  [id number]  89 y.o.  1965    Admit date: 5/1/2021    Discharge date : 5/2/2021    Chronic Diagnoses:    Problem List as of 5/2/2021 Never Reviewed          Codes Class Noted - Resolved    TIA (transient ischemic attack) ICD-10-CM: G45.9  ICD-9-CM: 435.9  5/1/2021 - Present          22    Final Diagnoses:   TIA (transient ischemic attack) [G45.9] vs anxiety induced symptoms  Atypical chest pain, likely musculoskeletal  Tobacco abuse      Reason for Hospitalization:  Aleksey Cobian is a 50 y.o. female who developed a sharp left upper chest pain and pressure last night which went up into her shoulder, left arm and face. It only lasted a couple minutes then resolved. When she woke up this morning she was okay but then she began having left-sided facial numbness at around 8:00 this morning while at work. She also noted some weakness in her left arm and was having trouble carrying a tray. She does note she has had this intermittently in the past as well. She denies shortness of breath     Patient does note a lot of anxiety and stress recently         Hospital Course:   She was admitted overnight as observation and started on aspirin    MRI of the brain was obtained which was normal.    Carotid duplex and echocardiogram were also unremarkable    I suspect her symptoms are largely related to anxiety. Doubt this represented a true TIA              Discharge Medications:   Current Discharge Medication List      START taking these medications    Details   aspirin 81 mg chewable tablet Take 1 Tab by mouth daily. Qty: 30 Tab, Refills: 0      atorvastatin (Lipitor) 40 mg tablet Take 1 Tab by mouth daily. Qty: 30 Tab, Refills: 0               Follow up Care:    1. None in 1-2 weeks. Please call to set up an appointment shortly after discharge. Diet:  Cardiac Diet    Disposition:  Home.     Advanced Directive:   FULL    DNR Discharge Exam:  General:  Alert, cooperative, no distress, appears stated age. Lungs:   Clear to auscultation bilaterally. Chest wall:  No tenderness or deformity. Heart:  Regular rate and rhythm, S1, S2 normal, no murmur, click, rub or gallop. Abdomen:   Soft, non-tender. Bowel sounds normal. No masses,  No organomegaly. Extremities: Extremities normal, atraumatic, no cyanosis or edema. Pulses: 2+ and symmetric all extremities. Skin: Skin color, texture, turgor normal. No rashes or lesions   Neurologic: CNII-XII intact. No gross sensory or motor deficits        CONSULTATIONS: None    Significant Diagnostic Studies:   5/1/2021: BUN 17 mg/dL (Ref range: 6 - 20 mg/dL); Calcium 9.0 mg/dL (Ref range: 8.5 - 10.1 mg/dL); CO2 26 mmol/L (Ref range: 21 - 32 mmol/L); Creatinine 0.80 mg/dL (Ref range: 0.55 - 1.02 mg/dL); Glucose 86 mg/dL (Ref range: 65 - 100 mg/dL); HCT 37.6 % (Ref range: 35.0 - 47.0 %); HGB 12.5 g/dL (Ref range: 11.5 - 16.0 g/dL); Potassium 3.7 mmol/L (Ref range: 3.5 - 5.1 mmol/L); Sodium 139 mmol/L (Ref range: 136 - 145 mmol/L)  Recent Labs     05/01/21  0920   WBC 5.5   HGB 12.5   HCT 37.6        Recent Labs     05/01/21  0920      K 3.7   *   CO2 26   BUN 17   CREA 0.80   GLU 86   CA 9.0     Recent Labs     05/01/21  0920   ALT 25   AP 72   TBILI 0.6   TP 7.1   ALB 3.9   GLOB 3.2     Recent Labs     05/01/21  0920   INR 1.0   PTP 13.3   APTT 27.9      No results for input(s): FE, TIBC, PSAT, FERR in the last 72 hours. No results for input(s): PH, PCO2, PO2 in the last 72 hours. No results for input(s): CPK, CKMB in the last 72 hours.     No lab exists for component: TROPONINI  Lab Results   Component Value Date/Time    Glucose (POC) 93 05/01/2021 09:16 AM       Discharge time spent 35 minutes    Signed:  Stephen Witt MD  5/2/2021  11:11 AM

## 2021-05-02 NOTE — PROGRESS NOTES
OCCUPATIONAL THERAPY EVALUATION/DISCHARGE  Patient: Ethan Valderrama (62 y.o. female)  Date: 5/2/2021  Primary Diagnosis: TIA (transient ischemic attack) [G45.9]        Precautions:        ASSESSMENT  Pt is 49 y/o female came to [facility] with left shart pper chest pain with pressure into shoulder left arm and face, woke up with left sided facial numbness 8:00am while at work and noted with some weakness left arm and placed under observation 5/1/2021 for TIA, atypical chest pain. MRI negative for acute/subacute events however showing \"probable tiny chronic lacunar infarct in the left caudate head. \" Pt has hx of UTI, tabacco abuse. Pt received semi supine in bed, A&Ox4 and agreeable for OT/PT eval/tx. Per pt report, pt lives with spouse and children in 2 story home with 5 steps ann rails to enter and is independent for self care and functional transfers/mobility. Pt currently presents with intact ann UE strength, intact ann UE coordination, intact ann UE sensation and at baseline independent functional status for self care and functional transfers/mobility. Pt with no acute OT needs at this time thus will D/C from skilled Ot services after eval. At this time, recommend discharge back to home independently when medically appropriate. Current Level of Function (ADLs/self-care): independent for all self care and transfers    Other factors to consider for discharge: at baseline functional status     PLAN :  Recommend with staff: allow toilet transfers    Recommendation for discharge: (in order for the patient to meet his/her long term goals)  No skilled occupational therapy/ follow up rehabilitation needs identified at this time. This discharge recommendation:  Home independently    IF patient discharges home will need the following DME: none       SUBJECTIVE:   Patient stated \"I feel fine now, I dont know why they kept me here last night.     OBJECTIVE DATA SUMMARY:   HISTORY:   Past Medical History:   Diagnosis Date    Urinary tract infection    History reviewed. No pertinent surgical history. Prior Level of Function/Environment/Context: independent for all self care and functional transfers/mobility. Expanded or extensive additional review of patient history:   Home Situation  Home Environment: Private residence  # Steps to Enter: 5  Rails to Enter: Yes  Hand Rails : Bilateral  One/Two Story Residence: Two story, live on 1st floor  Living Alone: No  Support Systems: Child(dominik), Family member(s), Spouse/Significant Other/Partner  Patient Expects to be Discharged to[de-identified] Private residence  Current DME Used/Available at Home: None  Tub or Shower Type: Tub/Shower combination    EXAMINATION OF PERFORMANCE DEFICITS:  Cognitive/Behavioral Status:  Neurologic State: Alert; Appropriate for age  Orientation Level: Oriented X4  Cognition: Appropriate for age attention/concentration; Appropriate decision making     Hearing: Auditory  Auditory Impairment: None    Range of Motion:  AROM: Within functional limits     Strength:  Strength: Within functional limits     Coordination:     Fine Motor Skills-Upper: Left Intact; Right Intact    Gross Motor Skills-Upper: Left Intact; Right Intact    Balance:  Sitting: Intact  Standing: Intact  Standing - Static: Good  Standing - Dynamic : Good    Functional Mobility and Transfers for ADLs:  Bed Mobility:  Rolling: Independent  Supine to Sit: Independent  Sit to Supine: Independent  Scooting: Independent    Transfers:  Sit to Stand: Independent  Stand to Sit: Independent  Bed to Chair: Independent  Bathroom Mobility: Independent  Toilet Transfer : Independent    ADL Assessment:     Oral Facial Hygiene/Grooming: Independent    Lower Body Dressing: Independent    Toileting: Independent     ADL Intervention and task modifications:     Grooming  Grooming Assistance: Independent  Position Performed: Standing  Washing Face: Independent  Washing Hands: Independent  Brushing Teeth: Independent  Brushing/Combing Hair: Independent    Lower Body Dressing Assistance  Socks: Independent  Position Performed: Seated edge of bed    Toileting  Toileting Assistance: Independent  Bladder Hygiene: Independent  Bowel Hygiene: Independent  Clothing Management: [address] [facility] AM-PACTM \"6 Clicks\"                                                       Daily Activity Inpatient Short Form  How much help from another person does the patient currently need. .. Total; A Lot A Little None   1. Putting on and taking off regular lower body clothing? []  1 []  2 []  3 [x]  4   2. Bathing (including washing, rinsing, drying)? []  1 []  2 []  3 [x]  4   3. Toileting, which includes using toilet, bedpan or urinal? [] 1 []  2 []  3 [x]  4   4. Putting on and taking off regular upper body clothing? []  1 []  2 []  3 [x]  4   5. Taking care of personal grooming such as brushing teeth? []  1 []  2 []  3 [x]  4   6. Eating meals? []  1 []  2 []  3 [x]  4   © , Trustees of [facility] [employer], under license to [employer]. All rights reserved     Score: 24     Interpretation of Tool:  Represents clinically-significant functional categories (i.e. Activities of daily living).   Percentage of Impairment CH    0%   CI    1-19% CJ    20-39% CK    40-59% CL    60-79% CM    80-99% CN     100%   [facility]  Score 6-24 24 23 20-22 15-19 10-14 7-9 6          Occupational Therapy Evaluation Charge Determination   History Examination Decision-Making   LOW Complexity : Brief history review  LOW Complexity : 1-3 performance deficits relating to physical, cognitive , or psychosocial skils that result in activity limitations and / or participation restrictions  LOW Complexity : No comorbidities that affect functional and no verbal or physical assistance needed to complete eval tasks       Based on the above components, the patient evaluation is determined to be of the following complexity level: LOW   Pain Ratin/10 headache    Activity Tolerance: WNL  Please refer to the flowsheet for vital signs taken during this treatment. After treatment patient left in no apparent distress:    Standing at sink completing grooming tasks    COMMUNICATION/EDUCATION:   The patients plan of care was discussed with: Physical therapist and Registered nurse. PT/OT sessions occurred together for increased safety of pt and clinician.       Thank you for this referral.  Holli Plan  Time Calculation: 18 mins

## 2021-05-02 NOTE — PROGRESS NOTES
PHYSICAL THERAPY EVALUATION  Patient: Aleksey Cobian (89 y.o. female)  Date: 5/2/2021  Primary Diagnosis: TIA (transient ischemic attack) [G45.9]        Precautions:        ASSESSMENT  Based on the objective data described below, the patient presents with decreased functional mobility, pain, and weakness. Pt agreeable to PT evaluation, A&O x 4 and 7/10 HA. Pt admitted 5/1/21 following onset of intermittent radiating L chest pain to L arm, L sided facial numbness, and weakness while at work admitted for possible stroke. Imaging negative for acute abnormalities. Pt reports frequently experiencing similar symptoms. Pmx: UTI. Pt states they live with  and children in 2 lvl home, lives on the 1st floor, with 5 AURELIA and B railing. Pt reports Ind PLOF with dressing/bathing and Ind amb with no AD, works as a  in a [address] [place] [address]. Pt completed bed mobility with Ind, transfers with Ind, and amb approx 30 ft with no AD and Mod I, occasionally reaching out for external support. Pt has fair static and dynamic standing balance, increased sway noted in EC narrow stance and tandem stance. Pt has fair activity tolerance, limited due to fatigue. Pt educated on activity pacing and LE HEP to complete throughout the day to prevent decline. Pt does not require acute skilled PT services at this time. PT d/c recc home with family care when medically appropriate. Current Level of Function Impacting Discharge (mobility/balance): fair activity tolerance, incr sway with dynamic gait    Patient will benefit from skilled therapy intervention to address the above noted impairments. PLAN :  Recommendation for discharge: (in order for the patient to meet his/her long term goals)  Pt does not require acute skilled PT services at this time. PT d/c recc home with family care when medically appropriate.     This discharge recommendation:  Has been made in collaboration with the attending provider and/or case management    IF patient discharges home will need the following DME: none         SUBJECTIVE:   Patient stated I slipped a month ago on water.     OBJECTIVE DATA SUMMARY:   HISTORY:    Past Medical History:   Diagnosis Date    Urinary tract infection    History reviewed. No pertinent surgical history. Personal factors and/or comorbidities impacting plan of care: repeated presentation of symptoms. Home Situation  Home Environment: Private residence  # Steps to Enter: 5  Rails to Enter: Yes  Hand Rails : Bilateral  One/Two Story Residence: Two story, live on 1st floor  Living Alone: No  Support Systems: Child(dominik), Family member(s), Spouse/Significant Other/Partner  Patient Expects to be Discharged to[de-identified] Private residence  Current DME Used/Available at Home: None  Tub or Shower Type: Tub/Shower combination  EXAMINATION/PRESENTATION/DECISION MAKING:   Critical Behavior:  Neurologic State: Alert, Appropriate for age  Orientation Level: Oriented X4  Cognition: Appropriate for age attention/concentration, Appropriate decision making     Hearing: Auditory  Auditory Impairment: None  Skin:  redness in palm of hands  Range Of Motion:  AROM: Within functional limits    Strength:    Strength: Within functional limits    Functional Mobility:  Bed Mobility:  Rolling: Independent  Supine to Sit: Independent  Sit to Supine: Independent  Scooting: Independent  Transfers:  Sit to Stand: Independent  Stand to Sit: Independent  Stand Pivot Transfers: Independent      Balance:   Sitting: Intact  Standing: Impaired  Standing - Static: Good  Standing - Dynamic : Fair  Ambulation/Gait Training:  Distance (ft): 30 Feet (ft)   Ambulation - Level of Assistance: Modified independent   Gait Description (WDL): Exceptions to WDL  Gait Abnormalities: Trunk sway increased   Base of Support: Narrowed   Speed/[place]: Pace decreased (<100 feet/min); Shuffled  Pt completed bed mobility with Ind, transfers with Ind, and amb approx 30 ft with no AD and Mod I, occasionally reaching out for external support. Pt tolerated standing self care at sink for approx 5 minutes. Pt has fair static and dynamic standing balance, increased sway noted in EC narrow stance and tandem stance. Pt has fair activity tolerance, limited due to fatigue. Pt educated on activity pacing and LE HEP to complete throughout the day to prevent decline. Pt recovered sitting EOB with all needs met. Functional Measure:  [facility] AM-[facility]AC 6 Clicks         Basic Mobility Inpatient Short Form  How much difficulty does the patient currently have. .. Unable A Lot A Little None   1. Turning over in bed (including adjusting bedclothes, sheets and blankets)? [] 1   [] 2   [] 3   [x] 4   2. Sitting down on and standing up from a chair with arms ( e.g., wheelchair, bedside commode, etc.)   [] 1   [] 2   [] 3   [x] 4   3. Moving from lying on back to sitting on the side of the bed? [] 1   [] 2   [] 3   [x] 4          How much help from another person does the patient currently need. .. Total A Lot A Little None   4. Moving to and from a bed to a chair (including a wheelchair)? [] 1   [] 2   [] 3   [x] 4   5. Need to walk in hospital room? [] 1   [] 2   [] 3   [x] 4   6. Climbing 3-5 steps with a railing? [] 1   [] 2   [x] 3   [] 4   © 2007, Trustees of [facility], under license to [employer]. All rights reserved     Score:  Initial: 23 Most Recent: X (Date: -- )   Interpretation of Tool:  Represents activities that are increasingly more difficult (i.e. Bed mobility, Transfers, Gait).   Score 24 23 22-20 19-15 14-10 9-7 6   Modifier CH CI CJ CK CL CM CN          Physical Therapy Evaluation Charge Determination   History Examination Presentation Decision-Making   MEDIUM  Complexity : 1-2 comorbidities / personal factors will impact the outcome/ POC  LOW Complexity : 1-2 Standardized tests and measures addressing body structure, function, activity limitation and / or participation in recreation  LOW Complexity : Stable, uncomplicated  Other Functional Measure [facility] 6 23      Based on the above components, the patient evaluation is determined to be of the following complexity level: LOW     Pain Ratin/10 HA RN aware    Activity Tolerance:   Fair  Please refer to the flowsheet for vital signs taken during this treatment. After treatment patient left in no apparent distress:   Supine in bed and Call bell within reach    COMMUNICATION/EDUCATION:   The patients plan of care was discussed with: Occupational therapist and Registered nurse. Fall prevention education was provided and the patient/caregiver indicated understanding. and Patient/family have participated as able in goal setting and plan of care. PT/OT sessions occurred together for increased safety of pt and clinician.    Thank you for this referral.  Juana Levin, PT, DPT   Time Calculation: 17 mins

## 2021-05-02 NOTE — CONSULTS
NEURO CONSULT      REASON FOR ADMISSION:  Left upper inner chest pain, left arm numbness      HISTORY:  Ms. Max Colindres is 50years old lady with a reported history of anxiety disorder, Amy Pelayo" who was admitted with a chief complaint of left chest pain with radiating numbness to the left arm and consulted to neurology secondary to the complaints. His symptoms lasted only \"a few minutes\" when she had some residual numbness in the left upper neck. Patient was brought to the ER. In the ER she had a head CT without contrast that showed no acute process and an MRI of the brain that was negative for any acute process she has since had a work-up. Gemzar completely resolved. Patient states that she is had left arm mild weakness for several months.         ROS:    General:                     No fever, no chills, no sweats, no generalized weakness, no weight loss/gain,                                       No loss of appetite   Eyes:                           No blurred vision, no eye pain, no loss of vision, no double vision  ENT:                            rhinorrhea, no pharyngitis   Respiratory:               No cough, no sputum production, no SOB, no ANGELO, no wheezing, no pleuritic pain   Cardiology:                No chest pain, no palpitations, no orthopnea, no PND, no edema, no syncope   Gastrointestinal:       No abdominal pain , no N/V, no diarrhea, no dysphagia, no constipation, no bleeding   Genitourinary:           frequency, no urgency, no dysuria, no hematuria, no incontinence   Muskuloskeletal :      No arthralgia, no myalgia, no back pain  Hematology:              No easy bruising, no nose or gum bleeding, no lymphadenopathy   Dermatological:         No rash, no ulceration, no pruritis, no color change / jaundice  Endocrine:                 hot flashes or polydipsia   Neurological:             No headache, no dizziness, no confusion, no focal weakness, no paresthesia,                                      No Speech difficulties, no memory loss, no gait difficulty  Psychological:          No neelings of anxiety, no depression, no agitation      NEURO EXAM:    Mental status: Awake, alert, oriented to day, month, year not aphasic    Cranial nerves: Cranial nerves intact    Motor exam: Very minimal weakness in the left arm, there could be a functional aspect to it    Sensory exam: Paresthesias in the left arm that have resolved    Coordination: Coordination intact    Gait and Station: Patient intact      ASSESSMENT:/PLAN  Above complaints of sharp pain in the left chest radiating to the left arm that lasted only a few minutes. She had some residual in her left leg. Patient also states that she has had several months of left arm mild weakness. An MRI of the brain was done which was within normal limits. It is not clear to me whether there is a possibility of fall contribution given her history of anxiety disorder. Possible \"TIA\"  A CT of the C-spine will need to be done to rule out any possible cervical stenosis.   That can be done as an out patient  She can go home on aspirin        ALLERGIES:    No Known Allergies    MEDS:      Current Facility-Administered Medications:     acetaminophen (TYLENOL) tablet 650 mg, 650 mg, Oral, Q4H PRN, 650 mg at 05/02/21 0552 **OR** acetaminophen (TYLENOL) solution 650 mg, 650 mg, Per NG tube, Q4H PRN **OR** acetaminophen (TYLENOL) suppository 650 mg, 650 mg, Rectal, Q4H PRN, Sanjeev Reyes MD    aspirin chewable tablet 81 mg, 81 mg, Oral, DAILY, Sanjeev Perez MD, 81 mg at 05/02/21 0900    enoxaparin (LOVENOX) injection 40 mg, 40 mg, SubCUTAneous, Q24H, Elsa Morton MD, 40 mg at 05/02/21 1187    LABS:  Recent Results (from the past 24 hour(s))   DUPLEX CAROTID BILATERAL    Collection Time: 05/02/21 10:27 AM   Result Value Ref Range    Left CCA dist sys 69.4 cm/s    Left CCA dist jorge 23.5 cm/s    Left CCA prox sys 72.4 cm/s    Left CCA prox jorge 21.1 cm/s    Left ICA dist sys 95.3 cm/s    Left ICA dist jorge 37.4 cm/s    Left ICA prox sys 89.3 cm/s    Left ICA prox jorge 32.0 cm/s    Left ECA sys 68.8 cm/s    LEFT EXTERNAL CAROTID ARTERY D 19.90 cm/s    Left subclavian sys 82.6 cm/s    LEFT SUBCLAVIAN ARTERY D 4.82 cm/s    Left vertebral sys 83.2 cm/s    LEFT VERTEBRAL ARTERY D 24.70 cm/s    Right cca dist sys 73.6 cm/s    Right CCA dist jorge 24.7 cm/s    Right CCA prox sys 133.0 cm/s    Right CCA prox jorge 23.7 cm/s    Right ICA dist sys 58.8 cm/s    Right ICA dist jorge 35.9 cm/s    Right ICA prox sys 80.2 cm/s    Right ICA prox jorge 33.2 cm/s    Right eca sys 74.8 cm/s    RIGHT EXTERNAL CAROTID ARTERY D 20.50 cm/s    Right subclavian sys 80.2 cm/s    RIGHT SUBCLAVIAN ARTERY D 9.17 cm/s    Right vertebral sys 68.2 cm/s    RIGHT VERTEBRAL ARTERY D 22.30 cm/s   ECHO ADULT COMPLETE    Collection Time: 05/02/21 10:30 AM   Result Value Ref Range    Aortic Regurgitant Pressure Half-time 764.00 ms    AR Max Sage 203.00 cm/s    Pulmonic Regurgitant End Max Velocity 142.00 cm/s    AoV PG 8.00 mmHg    IVSd 1.08 (A) 0.60 - 0.90 cm    LVIDd 4.34 3.90 - 5.30 cm    LVIDs 2.74 cm    Pulmonic Regurgitant End Max Velocity 85.80 cm/s    LVOT Peak Gradient 3.00 mmHg    LVPWd 1.00 (A) 0.60 - 0.90 cm    LV E' Septal Velocity 7.90 cm/s    LV ED Vol A2C 81.70 cm3    BP EF 74.8 55.0 - 100.0 %    LV ES Vol A2C 20.60 cm3    E/E' septal 8.92     LV Ejection Fraction MOD 2C 37 %    Mitral Valve E Wave Deceleration Time 141.00 ms    MV A Sage 65.40 cm/s    MV E Sage 70.50 cm/s    MV E/A 1.08     Pulmonic Regurgitant End Max Velocity 90.50 cm/s    Pulmonic Valve Systolic Peak Instantaneous Gradient 3.00 mmHg    P Vein A Dur 67.00 ms    Pulmonary Vein \"A\" Wave Velocity 31.30 cm/s    Est. RA Pressure 3.00 mmHg    RVIDd 3.10 cm    RVSP 17.00 mmHg    Tricuspid Valve Max Velocity 189.00 cm/s    Tricuspid Valve Max Velocity 189.00 cm/s    Triscuspid Valve Regurgitation Peak Gradient 14.00 mmHg    Right Atrial Area 4C 14.74 cm2    LV Mass .7 67.0 - 162.0 g    LV Mass AL Index 84.9 43.0 - 95.0 g/m2       Visit Vitals  /82   Pulse (!) 52   Temp 97.6 °F (36.4 °C)   Resp 18   Ht 5' 6\" (1.676 m)   Wt 70.8 kg (156 lb)   SpO2 97%   BMI 25.18 kg/m²       Imaging:  MRI BRAIN WO CONT   Final Result   No evidence of acute subacute CVA      CT CODE NEURO HEAD WO CONTRAST   Final Result   No acute intracranial process.       Results called to Joshua Temple on 5/1/2021 9:31 AM.      CT SPINE CERV W WO CONT    (Results Pending)

## 2021-05-09 NOTE — ED PROVIDER NOTES
EMERGENCY DEPARTMENT HISTORY AND PHYSICAL EXAM        Date: 5/1/2021  Patient Name: Ashish Arias    History of Presenting Illness     Chief Complaint   Patient presents with    Numbness    Extremity Weakness    Chest Pain       6:51 PM    History Provided By: Patient    HPI: Ashish Arias, 50 y.o. female with past medical history significant for elevated cholesterol and 30-pack-year smoking history presents to the emergency department with history of 2 episodes of left-sided facial and arm numbness. First episode was yesterday afternoon following the cute onset of left sided chest pain during activity with associated left arm weakness. Patient states this occurred related to numbness to left side of face in after chest pain started and that it all resolved on its own after approximately 1 hour. This morning patient states that she presented to recurrence of symptoms that involved pain in her left shoulder as well numbness to left side of her face. 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 Patient states that the symptoms lasted approximately total prior to arrival.  And have almost completely resolved upon arrival to the ED. Patient denies any of the following symptoms with either episode:  altered mental status, visual changes, vision, dysarthria, lower extremity weakness, facial weakness ataxia falls dizziness syncope near sink  Review of systems is negative    Due to patient's presentation a stroke alert was paged out from triage after triage nurse. PCP: None    Current Outpatient Medications   Medication Sig Dispense Refill    aspirin 81 mg chewable tablet Take 1 Tab by mouth daily. 30 Tab 0    atorvastatin (Lipitor) 40 mg tablet Take 1 Tab by mouth daily.  30 Tab 0       Past History     Past Medical History:  Past Medical History:   Diagnosis Date    Urinary tract infection        Past Surgical History:  History reviewed. No pertinent surgical history. Family History:  Family History   Problem Relation Age of Onset    No Known Problems Mother     No Known Problems Father        Social History:  Social History     Tobacco Use    Smoking status: Current Every Day Smoker     Packs/day: 0.50    Smokeless tobacco: Never Used   Substance Use Topics    Alcohol use: Never     Frequency: Never    Drug use: Never       Allergies:  No Known Allergies    Review of Systems   Review of Systems   Constitutional: Negative for chills, diaphoresis and fever. HENT: Negative for congestion, sore throat and trouble swallowing. Eyes: Negative for visual disturbance. Respiratory: Negative for cough and shortness of breath. Cardiovascular: Negative for chest pain and palpitations. Gastrointestinal: Negative for abdominal pain, diarrhea, nausea and vomiting. Endocrine: Negative for polydipsia, polyphagia and polyuria. Genitourinary: Negative for dysuria, frequency, hematuria and urgency. Musculoskeletal: Negative for gait problem and neck pain. Skin: Negative for rash. Neurological: Positive for weakness and numbness. Negative for dizziness, syncope, facial asymmetry, light-headedness and headaches. Psychiatric/Behavioral: Negative. Physical Exam   Physical Exam  Vitals signs and nursing note reviewed. Constitutional:       General: She is not in acute distress. Appearance: She is well-developed. She is not ill-appearing. Comments: Thin white female no acute distress dates of her symptoms have resolved. HENT:      Head: Normocephalic and atraumatic. Nose: Nose normal.      Mouth/Throat:      Mouth: Mucous membranes are moist.      Pharynx: No posterior oropharyngeal erythema. Eyes:      General: Vision grossly intact. Extraocular Movements: Extraocular movements intact.       Conjunctiva/sclera: Conjunctivae normal.      Pupils: Pupils are equal, round, and reactive to light. Neck:      Musculoskeletal: Neck supple. Vascular: No JVD. Trachea: No tracheal deviation. Cardiovascular:      Rate and Rhythm: Normal rate and regular rhythm. Pulses: Normal pulses. Carotid pulses are 2+ on the right side and 2+ on the left side. Radial pulses are 2+ on the right side and 2+ on the left side. Femoral pulses are 2+ on the right side and 2+ on the left side. Popliteal pulses are 2+ on the right side and 2+ on the left side. Dorsalis pedis pulses are 2+ on the right side and 2+ on the left side. Posterior tibial pulses are 2+ on the right side and 2+ on the left side. Heart sounds: Normal heart sounds. Pulmonary:      Effort: Pulmonary effort is normal.      Breath sounds: Normal breath sounds and air entry. No wheezing or rhonchi. Abdominal:      General: Bowel sounds are normal.      Palpations: Abdomen is soft. Tenderness: There is no abdominal tenderness. There is no guarding or rebound. Musculoskeletal:         General: No swelling or deformity. Right shoulder: She exhibits normal range of motion and no swelling. Right lower leg: No edema. Left lower leg: No edema. Skin:     General: Skin is warm and dry. Capillary Refill: Capillary refill takes less than 2 seconds. Findings: No signs of injury or rash. Neurological:      General: No focal deficit present. Mental Status: She is alert and oriented to person, place, and time. Cranial Nerves: No cranial nerve deficit. Motor: No weakness. Comments: Patient strength is 5/5 diffusely. Cranial nerves are all intact. Sensation to light touch intact   Psychiatric:         Behavior: Behavior is cooperative. Comments: Cooperative; patient mood is appropriate for situation.          Diagnostic Study Results     Labs -  05/01/21 1210  D DIMER   Collected: 05/01/21 1120  Final result  Specimen: Whole Blood from Plasma     D DIMER 0. 51High  ug/ml(FEU)             05/01/21 1158  TROPONIN I   Collected: 05/01/21 1120  Final result  Specimen: Serum or Plasma     Troponin-I, Qt. <0.05 ng/mL            41/72/50 6615  METABOLIC PANEL, COMPREHENSIVE   Collected: 05/01/21 0920  Final result  Specimen: Serum or Plasma     Sodium 139 mmol/L GFR est non-AA >60 ml/min/1.73m2    Potassium 3.7 mmol/L Calcium 9.0 mg/dL   Chloride 110High  mmol/L Bilirubin, total 0.6 mg/dL   CO2 26 mmol/L AST (SGOT) 11Low  U/L   Anion gap 3Low  mmol/L ALT (SGPT) 25 U/L   Glucose 86 mg/dL Alk. phosphatase 72 U/L   BUN 17 mg/dL Protein, total 7.1 g/dL   Creatinine 0.80 mg/dL Albumin 3.9 g/dL   BUN/Creatinine ratio 21High    Globulin 3.2 g/dL   GFR est AA >60 ml/min/1.73m2 A-G Ratio 1.2            05/01/21 0943  CBC WITH AUTOMATED DIFF   Collected: 05/01/21 0920  Final result  Specimen: Whole Blood     WBC 5.5 K/uL MONOCYTES 5 %   RBC 3.95 M/uL EOSINOPHILS 1 %   HGB 12.5 g/dL BASOPHILS 0 %   HCT 37.6 % IMMATURE GRANULOCYTES 0 %   MCV 95.2 FL ABS. NEUTROPHILS 3.1 K/UL   MCH 31.6 PG ABS. LYMPHOCYTES 2.1 K/UL   MCHC 33.2 g/dL ABS. MONOCYTES 0.3 K/UL   RDW 12.1 % ABS. EOSINOPHILS 0.0 K/UL   PLATELET 230 K/uL ABS. BASOPHILS 0.0 K/UL   MPV 10.9 FL ABS. IMM.  GRANS. 0.0 K/UL   NEUTROPHILS 56 % DF AUTOMATED     LYMPHOCYTES 38 %            05/01/21 0943  PROTHROMBIN TIME + INR   Collected: 05/01/21 0920  Final result  Specimen: Blood from Plasma     Prothrombin time 13.3 sec INR 1.0            05/01/21 0943  PTT   Collected: 05/01/21 0920  Final result  Specimen: Blood from Plasma     aPTT 27.9 sec  aPTT, therapeutic range   sec          05/01/21 0940  GLUCOSE, POC   Collected: 05/01/21 1265  Final result  Specimen: Whole Blood     Glucose (POC) 93 mg/dL  Performed by Piper Soares          Radiologic Studies -   MRI BRAIN WO CONT   Final Result   No evidence of acute subacute CVA      CT CODE NEURO HEAD WO CONTRAST   Final Result   No acute intracranial process. Results called to Joshua Temple on 5/1/2021 9:31 AM.            Medical Decision Making and ED Course     I have also reviewed the vital signs, available nursing notes, past medical history, past surgical history, family history and social history. Vital Signs - Reviewed the patient's vital signs. Visit Vitals  /82   Pulse (!) 52   Temp 97.6 °F (36.4 °C)   Resp 18   Ht 5' 6\" (1.676 m)   Wt 70.8 kg (156 lb)   SpO2 97%   BMI 25.18 kg/m²             Records Reviewed: Nursing Notes as available. Medical Decision Making/Diff Dx:  Differential diagnosis: TIA, Bell's palsy, CVA, migraine, complex migraine chest wall pain chest muscle strain brachial plexus compression shoulder strain PE    51-year-old white female with history elevated cholesterol and smoking presents with 2 episodes of facial numbness both associated with some degree of shoulder or chest wall pain. Agree with page after patient has a stroke alert will follow stroke protocol weight feedback from SOCneurology and radiology and head CT. Likely admission  ED course/Re-evaluation/Consultations/Other      CT results callback from radiology showing no acute. Case discussed with SOCneurology who request patient admission for further work-up for likely TIA. He endorses some evidence of a small lacunar that appears to be old. Disposition discussed with patient who agrees with admission. Procedures     PROCEDURES:  Procedures  Jyoti Chowdary MD    CRITICAL CARE NOTE: STROKE ALERT  6:51 PM  Amount of critical care time: 34 minutes  Impending deterioration: Cardiovascular and CNS  Associated risk factors: Metabolic changes and CNS Decompensation  Management: Bedside Assessment and Supervision of Care  Interpretation: Xrays, CT Scan, Blood Pressure and labs  Interventions: Neurologic interventions asa  Consultations:  With SOC-Neurology and patient  Case review: Hospitalist/Intensivist, Medical Sub-Specialist and Nursing  Treatment response: Resolved  Performed by: Self  Notes: I have spent critical care time involved in lab review, decision making, bedside attention, and documentation. This time excludes time spent in any separate billed procedures. During this entire length of time I was immediately available to the patient. Gwyn Esparza MD    Disposition     Admitted        Diagnosis     Clinical impression:   1. Left facial numbness    2. Left arm weakness    3.  TIA (transient ischemic attack)

## 2021-08-04 ENCOUNTER — APPOINTMENT (OUTPATIENT)
Dept: GENERAL RADIOLOGY | Age: 56
End: 2021-08-04
Attending: EMERGENCY MEDICINE
Payer: MEDICAID

## 2021-08-04 ENCOUNTER — HOSPITAL ENCOUNTER (EMERGENCY)
Age: 56
Discharge: HOME | End: 2021-08-04
Attending: EMERGENCY MEDICINE
Payer: MEDICAID

## 2021-08-04 ENCOUNTER — APPOINTMENT (OUTPATIENT)
Dept: CT IMAGING | Age: 56
End: 2021-08-04
Attending: EMERGENCY MEDICINE
Payer: MEDICAID

## 2021-08-04 VITALS
HEART RATE: 52 BPM | RESPIRATION RATE: 16 BRPM | WEIGHT: 145 LBS | BODY MASS INDEX: 23.3 KG/M2 | HEIGHT: 66 IN | OXYGEN SATURATION: 98 % | DIASTOLIC BLOOD PRESSURE: 64 MMHG | SYSTOLIC BLOOD PRESSURE: 107 MMHG | TEMPERATURE: 97.8 F

## 2021-08-04 DIAGNOSIS — N20.0 RIGHT NEPHROLITHIASIS: Primary | ICD-10-CM

## 2021-08-04 DIAGNOSIS — N30.01 ACUTE CYSTITIS WITH HEMATURIA: ICD-10-CM

## 2021-08-04 LAB
ALBUMIN SERPL-MCNC: 3.6 G/DL (ref 3.5–5)
ALBUMIN/GLOB SERPL: 1.1 {RATIO} (ref 1.1–2.2)
ALP SERPL-CCNC: 68 U/L (ref 45–117)
ALT SERPL-CCNC: 15 U/L (ref 12–78)
ANION GAP SERPL CALC-SCNC: 8 MMOL/L (ref 5–15)
APPEARANCE UR: ABNORMAL
AST SERPL W P-5'-P-CCNC: 13 U/L (ref 15–37)
BACTERIA URNS QL MICRO: ABNORMAL /HPF
BASOPHILS # BLD: 0 K/UL (ref 0–0.1)
BASOPHILS NFR BLD: 0 % (ref 0–1)
BILIRUB SERPL-MCNC: 0.5 MG/DL (ref 0.2–1)
BILIRUB UR QL: NEGATIVE
BUN SERPL-MCNC: 10 MG/DL (ref 6–20)
BUN/CREAT SERPL: 12 (ref 12–20)
CA-I BLD-MCNC: 8.5 MG/DL (ref 8.5–10.1)
CHLORIDE SERPL-SCNC: 107 MMOL/L (ref 97–108)
CO2 SERPL-SCNC: 27 MMOL/L (ref 21–32)
COLOR UR: ABNORMAL
CREAT SERPL-MCNC: 0.84 MG/DL (ref 0.55–1.02)
DIFFERENTIAL METHOD BLD: NORMAL
EOSINOPHIL # BLD: 0.1 K/UL (ref 0–0.4)
EOSINOPHIL NFR BLD: 2 % (ref 0–7)
EPITH CASTS URNS QL MICRO: ABNORMAL /LPF
ERYTHROCYTE [DISTWIDTH] IN BLOOD BY AUTOMATED COUNT: 12.3 % (ref 11.5–14.5)
GLOBULIN SER CALC-MCNC: 3.3 G/DL (ref 2–4)
GLUCOSE SERPL-MCNC: 87 MG/DL (ref 65–100)
GLUCOSE UR STRIP.AUTO-MCNC: NEGATIVE MG/DL
HCT VFR BLD AUTO: 36.9 % (ref 35–47)
HGB BLD-MCNC: 12.3 G/DL (ref 11.5–16)
HGB UR QL STRIP: ABNORMAL
IMM GRANULOCYTES # BLD AUTO: 0 K/UL (ref 0–0.04)
IMM GRANULOCYTES NFR BLD AUTO: 0 % (ref 0–0.5)
KETONES UR QL STRIP.AUTO: NEGATIVE MG/DL
LEUKOCYTE ESTERASE UR QL STRIP.AUTO: ABNORMAL
LIPASE SERPL-CCNC: 118 U/L (ref 73–393)
LYMPHOCYTES # BLD: 1.8 K/UL (ref 0.8–3.5)
LYMPHOCYTES NFR BLD: 30 % (ref 12–49)
MCH RBC QN AUTO: 31.7 PG (ref 26–34)
MCHC RBC AUTO-ENTMCNC: 33.3 G/DL (ref 30–36.5)
MCV RBC AUTO: 95.1 FL (ref 80–99)
MONOCYTES # BLD: 0.3 K/UL (ref 0–1)
MONOCYTES NFR BLD: 5 % (ref 5–13)
NEUTS SEG # BLD: 3.9 K/UL (ref 1.8–8)
NEUTS SEG NFR BLD: 63 % (ref 32–75)
NITRITE UR QL STRIP.AUTO: NEGATIVE
PH UR STRIP: 6.5 [PH] (ref 5–8)
PLATELET # BLD AUTO: 177 K/UL (ref 150–400)
PMV BLD AUTO: 10.6 FL (ref 8.9–12.9)
POTASSIUM SERPL-SCNC: 4.1 MMOL/L (ref 3.5–5.1)
PROT SERPL-MCNC: 6.9 G/DL (ref 6.4–8.2)
PROT UR STRIP-MCNC: NEGATIVE MG/DL
RBC # BLD AUTO: 3.88 M/UL (ref 3.8–5.2)
RBC #/AREA URNS HPF: ABNORMAL /HPF (ref 0–5)
SODIUM SERPL-SCNC: 142 MMOL/L (ref 136–145)
SP GR UR REFRACTOMETRY: 1 (ref 1–1.03)
UA: UC IF INDICATED,UAUC: ABNORMAL
UROBILINOGEN UR QL STRIP.AUTO: 0.1 EU/DL (ref 0.2–1)
WBC # BLD AUTO: 6.1 K/UL (ref 3.6–11)
WBC URNS QL MICRO: ABNORMAL /HPF (ref 0–4)

## 2021-08-04 PROCEDURE — 75810000275 HC EMERGENCY DEPT VISIT NO LEVEL OF CARE

## 2021-08-04 PROCEDURE — 96365 THER/PROPH/DIAG IV INF INIT: CPT

## 2021-08-04 PROCEDURE — 83690 ASSAY OF LIPASE: CPT

## 2021-08-04 PROCEDURE — 87086 URINE CULTURE/COLONY COUNT: CPT

## 2021-08-04 PROCEDURE — 74018 RADEX ABDOMEN 1 VIEW: CPT

## 2021-08-04 PROCEDURE — 74176 CT ABD & PELVIS W/O CONTRAST: CPT

## 2021-08-04 PROCEDURE — 74011250636 HC RX REV CODE- 250/636: Performed by: EMERGENCY MEDICINE

## 2021-08-04 PROCEDURE — 99283 EMERGENCY DEPT VISIT LOW MDM: CPT

## 2021-08-04 PROCEDURE — 81001 URINALYSIS AUTO W/SCOPE: CPT

## 2021-08-04 PROCEDURE — 74011000250 HC RX REV CODE- 250: Performed by: EMERGENCY MEDICINE

## 2021-08-04 PROCEDURE — 85025 COMPLETE CBC W/AUTO DIFF WBC: CPT

## 2021-08-04 PROCEDURE — 80053 COMPREHEN METABOLIC PANEL: CPT

## 2021-08-04 RX ORDER — CIPROFLOXACIN 500 MG/1
500 TABLET ORAL 2 TIMES DAILY
Qty: 20 TABLET | Refills: 0 | Status: SHIPPED | OUTPATIENT
Start: 2021-08-04 | End: 2021-08-14

## 2021-08-04 RX ORDER — PHENAZOPYRIDINE HYDROCHLORIDE 200 MG/1
200 TABLET, FILM COATED ORAL 3 TIMES DAILY
Qty: 6 TABLET | Refills: 0 | Status: SHIPPED | OUTPATIENT
Start: 2021-08-04 | End: 2021-08-06

## 2021-08-04 RX ADMIN — CEFTRIAXONE 1 G: 1 INJECTION, POWDER, FOR SOLUTION INTRAMUSCULAR; INTRAVENOUS at 11:15

## 2021-08-04 NOTE — ED PROVIDER NOTES
EMERGENCY DEPARTMENT HISTORY AND PHYSICAL EXAM      Date: 8/4/2021  Patient Name: Vito Correa    History of Presenting Illness     Chief Complaint   Patient presents with    Flank Pain       History Provided By: Patient    HPI: Vito Correa, 50 y.o. female with a past medical history significant recurrent UTIs, kidney infection presents to the ED with cc of mild left flank pain and dysuria of insidious onset of days duration. No fever or other constitutional symptoms. Return to the emergency room for any new or worsening symptoms. There are no other complaints, changes, or physical findings at this time. PCP: Crystal Finley MD    No current facility-administered medications on file prior to encounter. Current Outpatient Medications on File Prior to Encounter   Medication Sig Dispense Refill    aspirin 81 mg chewable tablet Take 1 Tab by mouth daily. 30 Tab 0    atorvastatin (Lipitor) 40 mg tablet Take 1 Tab by mouth daily. 30 Tab 0       Past History     Past Medical History:  Past Medical History:   Diagnosis Date    Urinary tract infection        Past Surgical History:  History reviewed. No pertinent surgical history. Family History:  Family History   Problem Relation Age of Onset    No Known Problems Mother     No Known Problems Father        Social History:  Social History     Tobacco Use    Smoking status: Current Every Day Smoker     Packs/day: 0.50    Smokeless tobacco: Never Used   Substance Use Topics    Alcohol use: Never    Drug use: Never       Allergies:  No Known Allergies      Review of Systems     Review of Systems   Constitutional: Negative for diaphoresis and fatigue. HENT: Negative for congestion, dental problem, ear discharge and ear pain. Eyes: Negative for discharge and redness. Respiratory: Negative for cough, chest tightness and shortness of breath. Cardiovascular: Negative for chest pain and palpitations.    Gastrointestinal: Negative for abdominal pain, constipation, diarrhea, nausea and vomiting. Endocrine: Negative. Genitourinary: Negative. Negative for dysuria and frequency. Musculoskeletal: Negative for arthralgias and myalgias. Skin: Negative. Neurological: Negative for dizziness, syncope and light-headedness. Hematological: Negative. Psychiatric/Behavioral: Negative for agitation and behavioral problems. All other systems reviewed and are negative. Physical Exam     Physical Exam  Vitals and nursing note reviewed. Constitutional:       Appearance: Normal appearance. She is normal weight. HENT:      Head: Normocephalic and atraumatic. Nose: Nose normal.      Mouth/Throat:      Mouth: Mucous membranes are moist.      Pharynx: Oropharynx is clear. Eyes:      Extraocular Movements: Extraocular movements intact. Conjunctiva/sclera: Conjunctivae normal.      Pupils: Pupils are equal, round, and reactive to light. Cardiovascular:      Rate and Rhythm: Normal rate and regular rhythm. Pulses: Normal pulses. Heart sounds: Normal heart sounds. Pulmonary:      Effort: Pulmonary effort is normal.      Breath sounds: Normal breath sounds. Abdominal:      General: Abdomen is flat. Palpations: Abdomen is soft. Tenderness: There is right CVA tenderness. Musculoskeletal:         General: Normal range of motion. Cervical back: Normal range of motion and neck supple. Skin:     General: Skin is warm and dry. Capillary Refill: Capillary refill takes less than 2 seconds. Neurological:      General: No focal deficit present. Mental Status: She is alert and oriented to person, place, and time. Psychiatric:         Mood and Affect: Mood normal.         Behavior: Behavior normal.         Lab and Diagnostic Study Results     Labs -   Results for Ninoska Worthy (MRN [id number]) as of 8/5/2021 16:27   Ref.  Range 8/4/2021 09:50   WBC Latest Ref Range: 3.6 - 11.0 K/uL 6.1   RBC Latest Ref Range: 3.80 - 5.20 M/uL 3.88   HGB Latest Ref Range: 11.5 - 16.0 g/dL 12.3   HCT Latest Ref Range: 35.0 - 47.0 % 36.9   MCV Latest Ref Range: 80.0 - 99.0 FL 95.1   MCH Latest Ref Range: 26.0 - 34.0 PG 31.7   MCHC Latest Ref Range: 30.0 - 36.5 g/dL 33.3   RDW Latest Ref Range: 11.5 - 14.5 % 12.3   PLATELET Latest Ref Range: 150 - 400 K/uL 177   MPV Latest Ref Range: 8.9 - 12.9 FL 10.6   NEUTROPHILS Latest Ref Range: 32 - 75 % 63   LYMPHOCYTES Latest Ref Range: 12 - 49 % 30   MONOCYTES Latest Ref Range: 5 - 13 % 5   EOSINOPHILS Latest Ref Range: 0 - 7 % 2   BASOPHILS Latest Ref Range: 0 - 1 % 0   IMMATURE GRANULOCYTES Latest Ref Range: 0.0 - 0.5 % 0   DF Latest Units:   AUTOMATED   ABS. NEUTROPHILS Latest Ref Range: 1.8 - 8.0 K/UL 3.9   ABS. IMM. GRANS. Latest Ref Range: 0.00 - 0.04 K/UL 0.0   ABS. LYMPHOCYTES Latest Ref Range: 0.8 - 3.5 K/UL 1.8   ABS. MONOCYTES Latest Ref Range: 0.0 - 1.0 K/UL 0.3   ABS. EOSINOPHILS Latest Ref Range: 0.0 - 0.4 K/UL 0.1   ABS.  BASOPHILS Latest Ref Range: 0.0 - 0.1 K/UL 0.0   Sodium Latest Ref Range: 136 - 145 mmol/L 142   Potassium Latest Ref Range: 3.5 - 5.1 mmol/L 4.1   Chloride Latest Ref Range: 97 - 108 mmol/L 107   CO2 Latest Ref Range: 21 - 32 mmol/L 27   Anion gap Latest Ref Range: 5 - 15 mmol/L 8   Glucose Latest Ref Range: 65 - 100 mg/dL 87   BUN Latest Ref Range: 6 - 20 mg/dL 10   Creatinine Latest Ref Range: 0.55 - 1.02 mg/dL 0.84   BUN/Creatinine ratio Latest Ref Range: 12 - 20   12   Calcium Latest Ref Range: 8.5 - 10.1 mg/dL 8.5   GFR est non-AA Latest Ref Range: >60 ml/min/1.73m2 >60   GFR est AA Latest Ref Range: >60 ml/min/1.73m2 >60   Bilirubin, total Latest Ref Range: 0.2 - 1.0 mg/dL 0.5   Protein, total Latest Ref Range: 6.4 - 8.2 g/dL 6.9   Albumin Latest Ref Range: 3.5 - 5.0 g/dL 3.6   Globulin Latest Ref Range: 2.0 - 4.0 g/dL 3.3   A-G Ratio Latest Ref Range: 1.1 - 2.2   1.1   ALT Latest Ref Range: 12 - 78 U/L 15   AST Latest Ref Range: 15 - 37 U/L 13 (L) Alk. phosphatase Latest Ref Range: 45 - 117 U/L 68   Lipase Latest Ref Range: 73 - 393 U/L 118   Results for Nolvia Fairchild (MRN [id number]) as of 8/5/2021 16:27   Ref. Range 8/4/2021 08:45   Color Latest Units:   Straw   Appearance Latest Ref Range: Clear   Hazy (A)   Specific gravity Latest Ref Range: 1.003 - 1.030   1.005   pH (UA) Latest Ref Range: 5.0 - 8.0   6.5   Protein Latest Ref Range: Negative mg/dL Negative   Glucose Latest Ref Range: Negative mg/dL Negative   Ketone Latest Ref Range: Negative mg/dL Negative   Blood Latest Ref Range: Negative   Large (A)   Bilirubin Latest Ref Range: Negative   Negative   Urobilinogen Latest Ref Range: 0.2 - 1.0 EU/dL 0.1 (L)   Nitrites Latest Ref Range: Negative   Negative   Leukocyte Esterase Latest Ref Range: Negative   Large (A)   Epithelial cells Latest Ref Range: Few /lpf Few   WBC Latest Ref Range: 0 - 4 /hpf    RBC Latest Ref Range: 0 - 5 /hpf 5-10   Bacteria Latest Ref Range: Negative /hpf 1+ (A)   CULTURE, URINE Unknown Rpt     No results found for this or any previous visit (from the past 12 hour(s)). Radiologic Studies -     CT Results  (Last 48 hours)               08/04/21 1133  CT ABD PELV WO CONT Final result    Impression:  Right renal calculus without hydronephrosis. Other findings as   above. Narrative:  CT abdomen and pelvis, 8/4/2021       History: Hematuria. Technique: No oral or intravenous contrast was administered. Multiple   contiguous axial images were obtained from the diaphragm to the inferior pubic   rami. Coronal and sagittal reconstruction of images was made. All CT scans at this facility are performed using dose reduction optimization   techniques as appropriate to perform the exam including the following: Automated   exposure control, adjustments of the mA and/or kV according to patient size, or   use iterative reconstruction technique. Comparison: Including Limited abdominal ultrasound 8/4/2017. Findings: The included lung bases show mild atelectasis. The liver, gallbladder, spleen, pancreas and adrenal glands show no focal   abnormalities on this noncontrast enhanced study. The right kidney has a 5 mm calculus associated with cortical thinning. The   left kidney has no calculi. No perinephric inflammatory changes or   hydronephrosis is present. No ureteral dilatation or intraureteral calculus is   identified. A 1.2 cm parapelvic cyst is seen in the right kidney, lower pole. A 2.0 cm cyst   is seen in the left kidney, lower pole. Smaller hypodensities in the left   kidney are possibly cysts but too small to fully characterize. The bladder is distended. No focal bladder wall thickening or intraluminal   calculus is present. There is trace fat herniation in the umbilicus. Fat-containing infraumbilical   hernia measures 4.8 cm in craniocaudal extent. The stomach has normal contours. There is no evidence of mechanical bowel   obstruction. The terminal ileum is within normal limits. The appendix images   normally. There is no specific CT evidence of colitis or diverticulitis. The uterus and adnexa show no focal abnormality. The abdominal aorta is normal in caliber and has atherosclerotic plaque. No free air or free fluid is seen. Degenerative changes are present in the spine. There is dextroconvex curvature   of the lumbar spine. CXR Results  (Last 48 hours)    None            Medical Decision Making   - I am the first provider for this patient. - I reviewed the vital signs, available nursing notes, past medical history, past surgical history, family history and social history. - Initial assessment performed. The patients presenting problems have been discussed, and they are in agreement with the care plan formulated and outlined with them.   I have encouraged them to ask questions as they arise throughout their visit.    Vital Signs-Reviewed the patient's vital signs. No data found. Records Reviewed: Nursing Notes    ED Course/Provider Notes (Medical Decision Making): Uneventful ED course, clinical improvement with therapy, patient will be discharged to followup with PCP as directed    Disposition     Disposition: Condition stable and improved  DC- Adult Discharges: All of the diagnostic tests were reviewed and questions answered. Diagnosis, care plan and treatment options were discussed. The patient understands the instructions and will follow up as directed. The patients results have been reviewed with them. They have been counseled regarding their diagnosis. The patient verbally convey understanding and agreement of the signs, symptoms, diagnosis, treatment and prognosis and additionally agrees to follow up as recommended with their PCP in 24 - 48 hours. They also agree with the care-plan and convey that all of their questions have been answered. I have also put together some discharge instructions for them that include: 1) educational information regarding their diagnosis, 2) how to care for their diagnosis at home, as well a 3) list of reasons why they would want to return to the ED prior to their follow-up appointment, should their condition change. Discharged    DISCHARGE PLAN:  1. Current Discharge Medication List      CONTINUE these medications which have NOT CHANGED    Details   aspirin 81 mg chewable tablet Take 1 Tab by mouth daily. Qty: 30 Tab, Refills: 0      atorvastatin (Lipitor) 40 mg tablet Take 1 Tab by mouth daily. Qty: 30 Tab, Refills: 0           2. Follow-up Information     Follow up With Specialties Details Why Contact Info    Jostin Rinaldi MD Family Medicine In 2 days  [address]  [phone]          3. Return to ED if worse   4.    Discharge Medication List as of 8/4/2021 12:25 PM      START taking these medications    Details   ciprofloxacin HCl (Cipro) 500 mg tablet Take 1 Tablet by mouth two (2) times a day for 10 days. , Normal, Disp-20 Tablet, R-0      phenazopyridine (Pyridium) 200 mg tablet Take 1 Tablet by mouth three (3) times daily for 2 days. , Normal, Disp-6 Tablet, R-0         CONTINUE these medications which have NOT CHANGED    Details   aspirin 81 mg chewable tablet Take 1 Tab by mouth daily. , No Print, Disp-30 Tab, R-0      atorvastatin (Lipitor) 40 mg tablet Take 1 Tab by mouth daily. , Normal, Disp-30 Tab, R-0               Diagnosis     Clinical Impression:   1. Right nephrolithiasis    2. Acute cystitis with hematuria        Attestations:    Debbe Alpers, MD    Please note that this dictation was completed with [employer], the [employer] voice recognition software. Quite often unanticipated grammatical, syntax, homophones, and other interpretive errors are inadvertently transcribed by the computer software. Please disregard these errors. Please excuse any errors that have escaped final proofreading. Thank you.

## 2021-08-04 NOTE — LETTER
[address] [id number] [phone]    Work Note    Date: 8/4/2021    To Whom It May concern:    Aleena Nixon was seen and treated today in the emergency room by the following provider(s):  Attending Provider: Nehemias Guido MD.      Aleena Nixon is excused from work on 08/04/21 and 08/05/21. She is medically clear to return to work on 8/6/2021.        Sincerely,          Андрей Conde

## 2021-08-04 NOTE — DISCHARGE INSTRUCTIONS
Take medicines as prescribed and follow-up with your PCP in 2 to 3 days. Return to emergency room for any new or worsening symptoms. Thank you! Thank you for allowing me to care for you in the emergency department. I sincerely hope that you are satisfied with your visit today. It is my goal to provide you with excellent care. Below you will find a list of your labs and imaging from your visit today. Should you have any questions regarding these results please do not hesitate to call the emergency department. Labs -     Recent Results (from the past 12 hour(s))   URINALYSIS W/ REFLEX CULTURE    Collection Time: 08/04/21  8:45 AM    Specimen: Urine   Result Value Ref Range    Color Straw      Appearance Hazy (A) Clear      Specific gravity 1.005 1.003 - 1.030      pH (UA) 6.5 5.0 - 8.0      Protein Negative Negative mg/dL    Glucose Negative Negative mg/dL    Ketone Negative Negative mg/dL    Bilirubin Negative Negative      Blood Large (A) Negative      Urobilinogen 0.1 (L) 0.2 - 1.0 EU/dL    Nitrites Negative Negative      Leukocyte Esterase Large (A) Negative      WBC  0 - 4 /hpf    RBC 5-10 0 - 5 /hpf    Epithelial cells Few Few /lpf    Bacteria 1+ (A) Negative /hpf    UA:UC IF INDICATED Urine Culture Ordered (A) Culture not indicated by UA result     CBC WITH AUTOMATED DIFF    Collection Time: 08/04/21  9:50 AM   Result Value Ref Range    WBC 6.1 3.6 - 11.0 K/uL    RBC 3.88 3.80 - 5.20 M/uL    HGB 12.3 11.5 - 16.0 g/dL    HCT 36.9 35.0 - 47.0 %    MCV 95.1 80.0 - 99.0 FL    MCH 31.7 26.0 - 34.0 PG    MCHC 33.3 30.0 - 36.5 g/dL    RDW 12.3 11.5 - 14.5 %    PLATELET 279 962 - 525 K/uL    MPV 10.6 8.9 - 12.9 FL    NEUTROPHILS 63 32 - 75 %    LYMPHOCYTES 30 12 - 49 %    MONOCYTES 5 5 - 13 %    EOSINOPHILS 2 0 - 7 %    BASOPHILS 0 0 - 1 %    IMMATURE GRANULOCYTES 0 0.0 - 0.5 %    ABS. NEUTROPHILS 3.9 1.8 - 8.0 K/UL    ABS. LYMPHOCYTES 1.8 0.8 - 3.5 K/UL    ABS. MONOCYTES 0.3 0.0 - 1.0 K/UL    ABS. EOSINOPHILS 0.1 0.0 - 0.4 K/UL    ABS. BASOPHILS 0.0 0.0 - 0.1 K/UL    ABS. IMM. GRANS. 0.0 0.00 - 0.04 K/UL    DF AUTOMATED     METABOLIC PANEL, COMPREHENSIVE    Collection Time: 08/04/21  9:50 AM   Result Value Ref Range    Sodium 142 136 - 145 mmol/L    Potassium 4.1 3.5 - 5.1 mmol/L    Chloride 107 97 - 108 mmol/L    CO2 27 21 - 32 mmol/L    Anion gap 8 5 - 15 mmol/L    Glucose 87 65 - 100 mg/dL    BUN 10 6 - 20 mg/dL    Creatinine 0.84 0.55 - 1.02 mg/dL    BUN/Creatinine ratio 12 12 - 20      GFR est AA >60 >60 ml/min/1.73m2    GFR est non-AA >60 >60 ml/min/1.73m2    Calcium 8.5 8.5 - 10.1 mg/dL    Bilirubin, total 0.5 0.2 - 1.0 mg/dL    AST (SGOT) 13 (L) 15 - 37 U/L    ALT (SGPT) 15 12 - 78 U/L    Alk. phosphatase 68 45 - 117 U/L    Protein, total 6.9 6.4 - 8.2 g/dL    Albumin 3.6 3.5 - 5.0 g/dL    Globulin 3.3 2.0 - 4.0 g/dL    A-G Ratio 1.1 1.1 - 2.2     LIPASE    Collection Time: 08/04/21  9:50 AM   Result Value Ref Range    Lipase 118 73 - 393 U/L       Radiologic Studies -   CT ABD PELV WO CONT   Final Result   Right renal calculus without hydronephrosis. Other findings as   above. XR ABD (KUB)   Final Result        CT Results  (Last 48 hours)                 08/04/21 1133  CT ABD PELV WO CONT Final result    Impression:  Right renal calculus without hydronephrosis. Other findings as   above. Narrative:  CT abdomen and pelvis, 8/4/2021       History: Hematuria. Technique: No oral or intravenous contrast was administered. Multiple   contiguous axial images were obtained from the diaphragm to the inferior pubic   rami. Coronal and sagittal reconstruction of images was made. All CT scans at this facility are performed using dose reduction optimization   techniques as appropriate to perform the exam including the following: Automated   exposure control, adjustments of the mA and/or kV according to patient size, or   use iterative reconstruction technique.        Comparison: Including Limited abdominal ultrasound 8/4/2017. Findings: The included lung bases show mild atelectasis. The liver, gallbladder, spleen, pancreas and adrenal glands show no focal   abnormalities on this noncontrast enhanced study. The right kidney has a 5 mm calculus associated with cortical thinning. The   left kidney has no calculi. No perinephric inflammatory changes or   hydronephrosis is present. No ureteral dilatation or intraureteral calculus is   identified. A 1.2 cm parapelvic cyst is seen in the right kidney, lower pole. A 2.0 cm cyst   is seen in the left kidney, lower pole. Smaller hypodensities in the left   kidney are possibly cysts but too small to fully characterize. The bladder is distended. No focal bladder wall thickening or intraluminal   calculus is present. There is trace fat herniation in the umbilicus. Fat-containing infraumbilical   hernia measures 4.8 cm in craniocaudal extent. The stomach has normal contours. There is no evidence of mechanical bowel   obstruction. The terminal ileum is within normal limits. The appendix images   normally. There is no specific CT evidence of colitis or diverticulitis. The uterus and adnexa show no focal abnormality. The abdominal aorta is normal in caliber and has atherosclerotic plaque. No free air or free fluid is seen. Degenerative changes are present in the spine. There is dextroconvex curvature   of the lumbar spine. CXR Results  (Last 48 hours)      None               If you feel that you have not received excellent quality care or timely care, please ask to speak to the nurse manager. Please choose us in the future for your continued health care needs.    ------------------------------------------------------------------------------------------------------------  The exam and treatment you received in the Emergency Department were for an urgent problem and are not intended as complete care. It is important that you follow-up with a doctor, nurse practitioner, or physician assistant to:  (1) confirm your diagnosis,  (2) re-evaluation of changes in your illness and treatment, and  (3) for ongoing care. If your symptoms become worse or you do not improve as expected and you are unable to reach your usual health care provider, you should return to the Emergency Department. We are available 24 hours a day. Please take your discharge instructions with you when you go to your follow-up appointment. If you have any problem arranging a follow-up appointment, contact the Emergency Department immediately. If a prescription has been provided, please have it filled as soon as possible to prevent a delay in treatment. Read the entire medication instruction sheet provided to you by the pharmacy. If you have any questions or reservations about taking the medication due to side effects or interactions with other medications, please call your primary care physician or contact the ER to speak with the charge nurse. Make an appointment with your family doctor or the physician you were referred to for follow-up of this visit as instructed on your discharge paperwork, as this is a mandatory follow-up. Return to the ER if you are unable to be seen or if you are unable to be seen in a timely manner. If you have any problem arranging the follow-up visit, contact the Emergency Department immediately.

## 2021-08-05 LAB
BACTERIA SPEC CULT: NORMAL
SPECIAL REQUESTS,SREQ: NORMAL

## 2022-01-04 ENCOUNTER — HOSPITAL ENCOUNTER (EMERGENCY)
Age: 57
Discharge: HOME | End: 2022-01-04
Attending: EMERGENCY MEDICINE
Payer: MEDICAID

## 2022-01-04 VITALS
SYSTOLIC BLOOD PRESSURE: 134 MMHG | RESPIRATION RATE: 16 BRPM | WEIGHT: 157 LBS | TEMPERATURE: 97.6 F | BODY MASS INDEX: 24.64 KG/M2 | OXYGEN SATURATION: 100 % | DIASTOLIC BLOOD PRESSURE: 86 MMHG | HEIGHT: 67 IN | HEART RATE: 63 BPM

## 2022-01-04 DIAGNOSIS — B34.9 VIRAL SYNDROME: Primary | ICD-10-CM

## 2022-01-04 PROCEDURE — 99282 EMERGENCY DEPT VISIT SF MDM: CPT

## 2022-01-04 RX ORDER — ALBUTEROL SULFATE 90 UG/1
2 AEROSOL, METERED RESPIRATORY (INHALATION)
Qty: 1 EACH | Refills: 0 | Status: SHIPPED | OUTPATIENT
Start: 2022-01-04

## 2022-01-04 NOTE — ED PROVIDER NOTES
EMERGENCY DEPARTMENT HISTORY AND PHYSICAL EXAM      Date: 1/4/2022  Patient Name: Allie Silva    History of Presenting Illness     Chief Complaint   Patient presents with    Concern For COVID-19 (Coronavirus)    Fatigue    Chills    Headache    Nausea       History Provided By: Patient    HPI: Allie Silva, 49 y.o. female presents to the ED with CC of body aches chills congestion and headaches. Started 3 days prior to arrival.  She says not getting any better and she treated with over-the-counter remedies without complete relief of her symptoms. She denies any fever, chest pain, shortness of breath,  night sweats. .   No exacerbating or relieving factors and her symptoms are mild to moderate at this point time he is not vaccinated for Covid    Patient denies SOB, chest pain, or any neurological symptoms. There are no other complaints, changes, or physical findings at this time. Past History     Past Medical History:  Past Medical History:   Diagnosis Date    Urinary tract infection        Allergies:  No Known Allergies    Review of Systems   Vital signs and nursing notes reviewed  Review of Systems   Constitutional: Negative. Negative for appetite change, chills, fatigue and fever. HENT: Positive for congestion. Negative for sinus pain. Eyes: Negative. Negative for pain and visual disturbance. Respiratory: Negative. Negative for chest tightness and shortness of breath. Cardiovascular: Negative. Negative for chest pain. Gastrointestinal: Negative. Negative for abdominal pain, diarrhea, nausea and vomiting. Genitourinary: Negative. Negative for difficulty urinating. No discharge   Musculoskeletal: Positive for myalgias. Negative for arthralgias. Skin: Negative. Negative for rash. Neurological: Positive for headaches. Negative for weakness. Hematological: Negative. Psychiatric/Behavioral: Negative. Negative for agitation. The patient is not nervous/anxious. All other systems reviewed and are negative. Physical Exam     Visit Vitals  /86 (BP 1 Location: Right upper arm, BP Patient Position: At rest)   Pulse 63   Temp 97.6 °F (36.4 °C)   Resp 16   Ht 5' 7\" (1.702 m)   Wt 71.2 kg (157 lb)   SpO2 100%   BMI 24.59 kg/m²     CONSTITUTIONAL: Alert, in no distress. Appears stated age. HEAD:  Normocephalic, atraumatic  EYES: EOM intact. No conjunctival injection or scleral icterus  Neck:  Supple. No meningismus  RESP: Normal with no work of breathing, speaking in full sentences. CV: Well perfused. NEURO: Alert with normal mentation, moving extremities spontaneously  PSYCH: Normal mood, normal affect    *    Medical Decision Making   Patient presents for COVID 19 testing with normal oxygen saturation and mild URI symptoms or COVID 19 exposure. COVID 19 testing was not conducted. The patient was given quarantine/isolation recommendations and agrees with the plan to be discharged home. They were provided instructions to return for difficulty breathing, chest pain, altered mentation, or any other new or worsening symptoms. ED Course:   Initial assessment performed. The patients presenting problems have been discussed, and they are in agreement with the care plan formulated and outlined with them. I have encouraged them to ask questions as they arise throughout their visit. Critical Care Time: None    Disposition:  DISCHARGE NOTE:  The pt is ready for discharge. The pt's signs, symptoms, diagnosis, and discharge instructions have been discussed and pt has conveyed their understanding. The pt is to follow up as recommended or return to ER should their symptoms worsen. Plan has been discussed and pt is in agreement. PLAN:  1.    Current Discharge Medication List      START taking these medications    Details   albuterol (PROVENTIL HFA, VENTOLIN HFA, PROAIR HFA) 90 mcg/actuation inhaler Take 2 Puffs by inhalation every four (4) hours as needed for Wheezing. Qty: 1 Each, Refills: 0  Start date: 1/4/2022           2. Follow-up Information     Follow up With Specialties Details Why Contact Info    Reginald Figueroa MD Family Medicine   [facility] [address]  [phone]          3. COVID Testing results will be called once available if positive. Patient should utilize MyChart to access results. 4. Take Tylenol or Ibuprofen as needed  5. Drink plenty of fluids  6. Return to ED if worse especially if any shortness of breath, chest pain or altered mentation. Diagnosis     Clinical Impression:   1. Viral syndrome            Please note that this dictation was completed with "[employer] the computer voice recognition software. Quite often unanticipated grammatical, syntax, homophones, and other interpretive errors are inadvertently transcribed by the computer software. Please disregards these errors. Please excuse any errors that have escaped final proofreading.

## 2022-01-04 NOTE — Clinical Note
[facility] EMERGENCY DEPARTMENT  [address] [address]  [phone]    Work/School Note    Date: 1/4/2022     To Whom It May concern:    Ivett Palacio was evaluated by the following provider(s):  Attending Provider: Maxx Ross [address] [place] [address] virus is suspected. Per the CDC guidelines we recommend home isolation until the following conditions are all met:    1. At least five days have passed since symptoms first appeared and/or had a close exposure,   2. After home isolation for five days, wearing a mask around others for the next five days,  3. At least 24 have passed since last fever without the use of fever-reducing medications and  4.  Symptoms (eg cough, shortness of breath) have improved      Sincerely,          Jaguar Caldera MD

## 2022-01-10 ENCOUNTER — HOSPITAL ENCOUNTER (EMERGENCY)
Age: 57
Discharge: HOME | End: 2022-01-10
Attending: EMERGENCY MEDICINE
Payer: MEDICAID

## 2022-01-10 ENCOUNTER — APPOINTMENT (OUTPATIENT)
Dept: CT IMAGING | Age: 57
End: 2022-01-10
Attending: EMERGENCY MEDICINE
Payer: MEDICAID

## 2022-01-10 VITALS
OXYGEN SATURATION: 99 % | SYSTOLIC BLOOD PRESSURE: 107 MMHG | HEIGHT: 66 IN | WEIGHT: 157 LBS | HEART RATE: 60 BPM | DIASTOLIC BLOOD PRESSURE: 77 MMHG | BODY MASS INDEX: 25.23 KG/M2 | TEMPERATURE: 97.7 F | RESPIRATION RATE: 16 BRPM

## 2022-01-10 DIAGNOSIS — R07.9 CHEST PAIN, UNSPECIFIED TYPE: Primary | ICD-10-CM

## 2022-01-10 LAB
ALBUMIN SERPL-MCNC: 3.8 G/DL (ref 3.5–5)
ALBUMIN/GLOB SERPL: 1.3 {RATIO} (ref 1.1–2.2)
ALP SERPL-CCNC: 69 U/L (ref 45–117)
ALT SERPL-CCNC: 19 U/L (ref 12–78)
ANION GAP SERPL CALC-SCNC: 8 MMOL/L (ref 5–15)
AST SERPL W P-5'-P-CCNC: 17 U/L (ref 15–37)
ATRIAL RATE: 57 BPM
BASOPHILS # BLD: 0 K/UL (ref 0–0.1)
BASOPHILS NFR BLD: 0 % (ref 0–1)
BILIRUB SERPL-MCNC: 0.4 MG/DL (ref 0.2–1)
BUN SERPL-MCNC: 10 MG/DL (ref 6–20)
BUN/CREAT SERPL: 13 (ref 12–20)
CA-I BLD-MCNC: 8.6 MG/DL (ref 8.5–10.1)
CALCULATED P AXIS, ECG09: 62 DEGREES
CALCULATED R AXIS, ECG10: 33 DEGREES
CALCULATED T AXIS, ECG11: -71 DEGREES
CHLORIDE SERPL-SCNC: 106 MMOL/L (ref 97–108)
CO2 SERPL-SCNC: 31 MMOL/L (ref 21–32)
CREAT SERPL-MCNC: 0.78 MG/DL (ref 0.55–1.02)
DIAGNOSIS, 93000: NORMAL
DIFFERENTIAL METHOD BLD: NORMAL
EOSINOPHIL # BLD: 0 K/UL (ref 0–0.4)
EOSINOPHIL NFR BLD: 0 % (ref 0–7)
ERYTHROCYTE [DISTWIDTH] IN BLOOD BY AUTOMATED COUNT: 12.3 % (ref 11.5–14.5)
GLOBULIN SER CALC-MCNC: 3 G/DL (ref 2–4)
GLUCOSE SERPL-MCNC: 91 MG/DL (ref 65–100)
HCT VFR BLD AUTO: 40.8 % (ref 35–47)
HGB BLD-MCNC: 13.4 G/DL (ref 11.5–16)
IMM GRANULOCYTES # BLD AUTO: 0 K/UL
IMM GRANULOCYTES NFR BLD AUTO: 0 %
INR PPP: 1 (ref 0.9–1.1)
LYMPHOCYTES # BLD: 1.6 K/UL (ref 0.8–3.5)
LYMPHOCYTES NFR BLD: 44 % (ref 12–49)
MAGNESIUM SERPL-MCNC: 1.8 MG/DL (ref 1.6–2.4)
MCH RBC QN AUTO: 30.9 PG (ref 26–34)
MCHC RBC AUTO-ENTMCNC: 32.8 G/DL (ref 30–36.5)
MCV RBC AUTO: 94 FL (ref 80–99)
MONOCYTES # BLD: 0.2 K/UL (ref 0–1)
MONOCYTES NFR BLD: 6 % (ref 5–13)
NEUTS BAND NFR BLD MANUAL: 2 % (ref 0–6)
NEUTS SEG # BLD: 1.8 K/UL (ref 1.8–8)
NEUTS SEG NFR BLD: 48 % (ref 32–75)
P-R INTERVAL, ECG05: 174 MS
PLATELET # BLD AUTO: 160 K/UL (ref 150–400)
PMV BLD AUTO: 11.3 FL (ref 8.9–12.9)
POTASSIUM SERPL-SCNC: 3.4 MMOL/L (ref 3.5–5.1)
PROT SERPL-MCNC: 6.8 G/DL (ref 6.4–8.2)
PROTHROMBIN TIME: 10.4 SEC (ref 9–11.1)
Q-T INTERVAL, ECG07: 454 MS
QRS DURATION, ECG06: 88 MS
QTC CALCULATION (BEZET), ECG08: 441 MS
RBC # BLD AUTO: 4.34 M/UL (ref 3.8–5.2)
RBC MORPH BLD: NORMAL
SODIUM SERPL-SCNC: 145 MMOL/L (ref 136–145)
TROPONIN-HIGH SENSITIVITY: 9 NG/L (ref 0–51)
VENTRICULAR RATE, ECG03: 57 BPM
WBC # BLD AUTO: 3.6 K/UL (ref 3.6–11)

## 2022-01-10 PROCEDURE — 74011000636 HC RX REV CODE- 636: Performed by: EMERGENCY MEDICINE

## 2022-01-10 PROCEDURE — 85025 COMPLETE CBC W/AUTO DIFF WBC: CPT

## 2022-01-10 PROCEDURE — 71275 CT ANGIOGRAPHY CHEST: CPT

## 2022-01-10 PROCEDURE — 85610 PROTHROMBIN TIME: CPT

## 2022-01-10 PROCEDURE — 99283 EMERGENCY DEPT VISIT LOW MDM: CPT

## 2022-01-10 PROCEDURE — 84484 ASSAY OF TROPONIN QUANT: CPT

## 2022-01-10 PROCEDURE — 80053 COMPREHEN METABOLIC PANEL: CPT

## 2022-01-10 PROCEDURE — 83735 ASSAY OF MAGNESIUM: CPT

## 2022-01-10 PROCEDURE — 93005 ELECTROCARDIOGRAM TRACING: CPT

## 2022-01-10 RX ORDER — KETOROLAC TROMETHAMINE 10 MG/1
10 TABLET, FILM COATED ORAL
Qty: 20 TABLET | Refills: 0 | Status: SHIPPED | OUTPATIENT
Start: 2022-01-10 | End: 2022-01-15

## 2022-01-10 RX ADMIN — IOPAMIDOL 100 ML: 755 INJECTION, SOLUTION INTRAVENOUS at 13:18

## 2022-01-10 NOTE — Clinical Note
[facility] EMERGENCY DEPARTMENT  [address] [facility] [address]  [phone]    Work/School Note    Date: 1/10/2022    To Whom It May concern:    Kalen Tapia was seen and treated today in the emergency room by the following provider(s):  Attending Provider: Francisco Noyola MD.      Kalen Tapia is excused from work/school on 1/10/2022 through 1/12/2022. She is medically clear to return to work/school on 1/13/2022.          Sincerely,          Jonatan Salazar MD

## 2022-01-10 NOTE — ED PROVIDER NOTES
EMERGENCY DEPARTMENT HISTORY AND PHYSICAL EXAM      Date: 1/10/2022  Patient Name: Delores August      History of Presenting Illness     Chief Complaint   Patient presents with    Chest Pain       History Provided By: Patient    HPI: Delores August, 49 y.o. female with a past medical history significant For daily tobacco use presents to the ED with cc of left-sided chest pain. Patient ports pain is pressure, without radiation, without noted aggravating alleviating factors. Patient reports having seen her cardiologist for the same with a negative stress test.    There are no other complaints, changes, or physical findings at this time. PCP: Miguelito Henao MD    Current Outpatient Medications   Medication Sig Dispense Refill    ketorolac (TORADOL) 10 mg tablet Take 1 Tablet by mouth every six (6) hours as needed for Pain for up to 5 days. 20 Tablet 0    albuterol (PROVENTIL HFA, VENTOLIN HFA, PROAIR HFA) 90 mcg/actuation inhaler Take 2 Puffs by inhalation every four (4) hours as needed for Wheezing. 1 Each 0    aspirin 81 mg chewable tablet Take 1 Tab by mouth daily. 30 Tab 0    atorvastatin (Lipitor) 40 mg tablet Take 1 Tab by mouth daily. 30 Tab 0       Past History     Past Medical History:  Past Medical History:   Diagnosis Date    Chest pain     Urinary tract infection        Past Surgical History:  No past surgical history on file. Family History:  Family History   Problem Relation Age of Onset    No Known Problems Mother     No Known Problems Father        Social History:  Social History     Tobacco Use    Smoking status: Current Every Day Smoker     Packs/day: 0.50    Smokeless tobacco: Never Used   Substance Use Topics    Alcohol use: Never    Drug use: Never       Allergies:  No Known Allergies      Review of Systems   Review of Systems   Constitutional: Negative for chills and fever. HENT: Negative for sinus pressure and sinus pain. Eyes: Negative for photophobia and redness. Respiratory: Negative for shortness of breath and wheezing. Cardiovascular: Positive for chest pain and palpitations. Gastrointestinal: Negative for abdominal pain and nausea. Genitourinary: Negative for flank pain and hematuria. Musculoskeletal: Negative for arthralgias and gait problem. Skin: Negative for color change and pallor. Neurological: Negative for dizziness and weakness. Review of Systems    Physical Exam   Physical Exam  Constitutional:       General: No acute distress. Appearance: Normal appearance. Not toxic-appearing. HENT:      Head: Normocephalic and atraumatic. Nose: Nose normal.      Mouth/Throat:      Mouth: Mucous membranes are moist.   Eyes:      Extraocular Movements: Extraocular movements intact. Pupils: Pupils are equal, round, and reactive to light. Cardiovascular:      Rate and Rhythm: Normal rate. Pulses: Normal pulses. Pulmonary:      Effort: Pulmonary effort is normal.      Breath sounds: No stridor. Abdominal:      General: Abdomen is flat. There is no distension. Musculoskeletal:         General: Normal range of motion. Cervical back: Normal range of motion and neck supple. Skin:     General: Skin is warm and dry. Capillary Refill: Capillary refill takes less than 2 seconds. Neurological:      General: No focal deficit present. Mental Status: Aert and oriented to person, place, and time.    Psychiatric:         Mood and Affect: Mood normal.         Behavior: Behavior normal.     Physical Exam    Lab and Diagnostic Study Results     Labs -     Recent Results (from the past 12 hour(s))   EKG, 12 LEAD, INITIAL    Collection Time: 01/10/22 10:56 AM   Result Value Ref Range    Ventricular Rate 57 BPM    Atrial Rate 57 BPM    P-R Interval 174 ms    QRS Duration 88 ms    Q-T Interval 454 ms    QTC Calculation (Bezet) 441 ms    Calculated P Axis 62 degrees    Calculated R Axis 33 degrees    Calculated T Axis -71 degrees Diagnosis       Sinus bradycardia  ST & T wave abnormality, consider inferolateral ischemia  vs strain pattern  Abnormal ECG  When compared with ECG of 01-MAY-2021 20:47,  T wave inversion now evident in Inferior leads  T wave inversion now evident in Lateral leads  Confirmed by Marly Singh ([id number]) on 1/10/2022 12:56:02 PM     CBC WITH AUTOMATED DIFF    Collection Time: 01/10/22 12:10 PM   Result Value Ref Range    WBC 3.6 3.6 - 11.0 K/uL    RBC 4.34 3.80 - 5.20 M/uL    HGB 13.4 11.5 - 16.0 g/dL    HCT 40.8 35.0 - 47.0 %    MCV 94.0 80.0 - 99.0 FL    MCH 30.9 26.0 - 34.0 PG    MCHC 32.8 30.0 - 36.5 g/dL    RDW 12.3 11.5 - 14.5 %    PLATELET 716 126 - 872 K/uL    MPV 11.3 8.9 - 12.9 FL    NEUTROPHILS 48 32 - 75 %    BAND NEUTROPHILS 2 0 - 6 %    LYMPHOCYTES 44 12 - 49 %    MONOCYTES 6 5 - 13 %    EOSINOPHILS 0 0 - 7 %    BASOPHILS 0 0 - 1 %    IMMATURE GRANULOCYTES 0 %    ABS. NEUTROPHILS 1.8 1.8 - 8.0 K/UL    ABS. LYMPHOCYTES 1.6 0.8 - 3.5 K/UL    ABS. MONOCYTES 0.2 0.0 - 1.0 K/UL    ABS. EOSINOPHILS 0.0 0.0 - 0.4 K/UL    ABS. BASOPHILS 0.0 0.0 - 0.1 K/UL    ABS. IMM. GRANS. 0.0 K/UL    DF AUTOMATED      RBC COMMENTS Normocytic, Normochromic     PROTHROMBIN TIME + INR    Collection Time: 01/10/22 12:10 PM   Result Value Ref Range    Prothrombin time 10.4 9.0 - 11.1 sec    INR 1.0 0.9 - 1.1     METABOLIC PANEL, COMPREHENSIVE    Collection Time: 01/10/22 12:10 PM   Result Value Ref Range    Sodium 145 136 - 145 mmol/L    Potassium 3.4 (L) 3.5 - 5.1 mmol/L    Chloride 106 97 - 108 mmol/L    CO2 31 21 - 32 mmol/L    Anion gap 8 5 - 15 mmol/L    Glucose 91 65 - 100 mg/dL    BUN 10 6 - 20 mg/dL    Creatinine 0.78 0.55 - 1.02 mg/dL    BUN/Creatinine ratio 13 12 - 20      GFR est AA >60 >60 ml/min/1.73m2    GFR est non-AA >60 >60 ml/min/1.73m2    Calcium 8.6 8.5 - 10.1 mg/dL    Bilirubin, total 0.4 0.2 - 1.0 mg/dL    AST (SGOT) 17 15 - 37 U/L    ALT (SGPT) 19 12 - 78 U/L    Alk.  phosphatase 69 45 - 117 U/L    Protein, total 6.8 6.4 - 8.2 g/dL    Albumin 3.8 3.5 - 5.0 g/dL    Globulin 3.0 2.0 - 4.0 g/dL    A-G Ratio 1.3 1.1 - 2.2     TROPONIN-HIGH SENSITIVITY    Collection Time: 01/10/22 12:10 PM   Result Value Ref Range    Troponin-High Sensitivity 9 0 - 51 ng/L   MAGNESIUM    Collection Time: 01/10/22 12:10 PM   Result Value Ref Range    Magnesium 1.8 1.6 - 2.4 mg/dL       Radiologic Studies -   [unfilled]  CT Results  (Last 48 hours)               01/10/22 1331  CTA CHEST W OR W WO CONT Final result    Impression:  No CT evidence for PE. Narrative:  CTA chest.       Axial images are reviewed along with reformatted sagittal/coronal/MIP images. 100 mL Isovue 370 administered. Dose reduction: All CT scans at this facility are performed using dose reduction   optimization techniques as appropriate to a performed exam including the   following-   automated exposure control, adjustments of mA and/or Kv according to patient   size, or use of iterative reconstructive technique. Lungs are clear. No pleural effusion. Enhanced images demonstrate normal contrast opacification thoracic aorta and   great vessels traversing the superior mediastinum. Normal contrast   opacification, normal caliber pulmonary arterial trunk. Pulmonary arterial   branch vessels normally opacify. No CT evidence for PE. No pericardial effusion. Imaged content upper abdomen appears unremarkable. CXR Results  (Last 48 hours)    None          Medical Decision Making and ED Course   - I am the first and primary provider for this patient AND AM THE PRIMARY PROVIDER OF RECORD. - I reviewed the vital signs, available nursing notes, past medical history, past surgical history, family history and social history. - Initial assessment performed. The patients presenting problems have been discussed, and the staff are in agreement with the care plan formulated and outlined with them.   I have encouraged them to ask questions as they arise throughout their visit. Vital Signs-Reviewed the patient's vital signs. Patient Vitals for the past 12 hrs:   Temp Pulse Resp BP SpO2   01/10/22 1052 97.7 °F (36.5 °C) 60 16 107/77 99 %   01/10/22 1047     98 %     t  ED Course:       ED Course as of 01/10/22 1411   Mon Harris 10, 2022   1409 Discussed unremarkable work-up including negative troponin, negative CTA. Will have cardiology referral, treat symptomatically more as musculoskeletal.  Discussed at length need for follow-up as well as return precautions. [CS]      ED Course User Index  [CS] Cathy Perla MD           Disposition     Disposition: DC- Adult Discharges: All of the diagnostic tests were reviewed and questions answered. Diagnosis, care plan and treatment options were discussed. The patient understands the instructions and will follow up as directed. The patients results have been reviewed with them. They have been counseled regarding their diagnosis. The patient verbally convey understanding and agreement of the signs, symptoms, diagnosis, treatment and prognosis and additionally agrees to follow up as recommended with their PCP in 24 - 48 hours. They also agree with the care-plan and convey that all of their questions have been answered. I have also put together some discharge instructions for them that include: 1) educational information regarding their diagnosis, 2) how to care for their diagnosis at home, as well a 3) list of reasons why they would want to return to the ED prior to their follow-up appointment, should their condition change. Remove if not discharged  DISCHARGE PLAN:  1. Current Discharge Medication List      CONTINUE these medications which have NOT CHANGED    Details   albuterol (PROVENTIL HFA, VENTOLIN HFA, PROAIR HFA) 90 mcg/actuation inhaler Take 2 Puffs by inhalation every four (4) hours as needed for Wheezing.   Qty: 1 Each, Refills: 0      aspirin 81 mg chewable tablet Take 1 Tab by mouth daily. Qty: 30 Tab, Refills: 0      atorvastatin (Lipitor) 40 mg tablet Take 1 Tab by mouth daily. Qty: 30 Tab, Refills: 0           2. Follow-up Information     Follow up With Specialties Details Why Contact Info    Minnie Oneil MD [facility] Shahla Rivas [facility] Vascular Surgery, Cardiology, Interventional Cardiology Schedule an appointment as soon as possible for a visit   [address] [place] [address] [id number] [phone]          3. Return to ED if worse   4. Current Discharge Medication List      START taking these medications    Details   ketorolac (TORADOL) 10 mg tablet Take 1 Tablet by mouth every six (6) hours as needed for Pain for up to 5 days. Qty: 20 Tablet, Refills: 0  Start date: 1/10/2022, End date: 1/15/2022             Diagnosis     Clinical Impression:   1. Chest pain, unspecified type        Attestations:    Adan Boeck, MD    Please note that this dictation was completed with [employer], the computer voice recognition software. Quite often unanticipated grammatical, syntax, homophones, and other interpretive errors are inadvertently transcribed by the computer software. Please disregard these errors. Please excuse any errors that have escaped final proofreading. Thank you.

## 2022-01-10 NOTE — DISCHARGE INSTRUCTIONS
Thank you! Thank you for allowing me to care for you in the emergency department. I sincerely hope that you are satisfied with your visit today. It is my goal to provide you with excellent care. Below you will find a list of your labs and imaging from your visit today. Should you have any questions regarding these results please do not hesitate to call the emergency department. Labs -     Recent Results (from the past 12 hour(s))   EKG, 12 LEAD, INITIAL    Collection Time: 01/10/22 10:56 AM   Result Value Ref Range    Ventricular Rate 57 BPM    Atrial Rate 57 BPM    P-R Interval 174 ms    QRS Duration 88 ms    Q-T Interval 454 ms    QTC Calculation (Bezet) 441 ms    Calculated P Axis 62 degrees    Calculated R Axis 33 degrees    Calculated T Axis -71 degrees    Diagnosis       Sinus bradycardia  ST & T wave abnormality, consider inferolateral ischemia  vs strain pattern  Abnormal ECG  When compared with ECG of 01-MAY-2021 20:47,  T wave inversion now evident in Inferior leads  T wave inversion now evident in Lateral leads  Confirmed by Heather Rubio ([id number]) on 1/10/2022 12:56:02 PM     CBC WITH AUTOMATED DIFF    Collection Time: 01/10/22 12:10 PM   Result Value Ref Range    WBC 3.6 3.6 - 11.0 K/uL    RBC 4.34 3.80 - 5.20 M/uL    HGB 13.4 11.5 - 16.0 g/dL    HCT 40.8 35.0 - 47.0 %    MCV 94.0 80.0 - 99.0 FL    MCH 30.9 26.0 - 34.0 PG    MCHC 32.8 30.0 - 36.5 g/dL    RDW 12.3 11.5 - 14.5 %    PLATELET 237 048 - 378 K/uL    MPV 11.3 8.9 - 12.9 FL    NEUTROPHILS 48 32 - 75 %    BAND NEUTROPHILS 2 0 - 6 %    LYMPHOCYTES 44 12 - 49 %    MONOCYTES 6 5 - 13 %    EOSINOPHILS 0 0 - 7 %    BASOPHILS 0 0 - 1 %    IMMATURE GRANULOCYTES 0 %    ABS. NEUTROPHILS 1.8 1.8 - 8.0 K/UL    ABS. LYMPHOCYTES 1.6 0.8 - 3.5 K/UL    ABS. MONOCYTES 0.2 0.0 - 1.0 K/UL    ABS. EOSINOPHILS 0.0 0.0 - 0.4 K/UL    ABS. BASOPHILS 0.0 0.0 - 0.1 K/UL    ABS. IMM.  GRANS. 0.0 K/UL    DF AUTOMATED      RBC COMMENTS Normocytic, Normochromic PROTHROMBIN TIME + INR    Collection Time: 01/10/22 12:10 PM   Result Value Ref Range    Prothrombin time 10.4 9.0 - 11.1 sec    INR 1.0 0.9 - 1.1     METABOLIC PANEL, COMPREHENSIVE    Collection Time: 01/10/22 12:10 PM   Result Value Ref Range    Sodium 145 136 - 145 mmol/L    Potassium 3.4 (L) 3.5 - 5.1 mmol/L    Chloride 106 97 - 108 mmol/L    CO2 31 21 - 32 mmol/L    Anion gap 8 5 - 15 mmol/L    Glucose 91 65 - 100 mg/dL    BUN 10 6 - 20 mg/dL    Creatinine 0.78 0.55 - 1.02 mg/dL    BUN/Creatinine ratio 13 12 - 20      GFR est AA >60 >60 ml/min/1.73m2    GFR est non-AA >60 >60 ml/min/1.73m2    Calcium 8.6 8.5 - 10.1 mg/dL    Bilirubin, total 0.4 0.2 - 1.0 mg/dL    AST (SGOT) 17 15 - 37 U/L    ALT (SGPT) 19 12 - 78 U/L    Alk. phosphatase 69 45 - 117 U/L    Protein, total 6.8 6.4 - 8.2 g/dL    Albumin 3.8 3.5 - 5.0 g/dL    Globulin 3.0 2.0 - 4.0 g/dL    A-G Ratio 1.3 1.1 - 2.2     TROPONIN-HIGH SENSITIVITY    Collection Time: 01/10/22 12:10 PM   Result Value Ref Range    Troponin-High Sensitivity 9 0 - 51 ng/L   MAGNESIUM    Collection Time: 01/10/22 12:10 PM   Result Value Ref Range    Magnesium 1.8 1.6 - 2.4 mg/dL       Radiologic Studies -   CTA CHEST W OR W WO CONT   Final Result   No CT evidence for PE.        CT Results  (Last 48 hours)                 01/10/22 1331  CTA CHEST W OR W WO CONT Final result    Impression:  No CT evidence for PE. Narrative:  CTA chest.       Axial images are reviewed along with reformatted sagittal/coronal/MIP images. 100 mL Isovue 370 administered. Dose reduction: All CT scans at this facility are performed using dose reduction   optimization techniques as appropriate to a performed exam including the   following-   automated exposure control, adjustments of mA and/or Kv according to patient   size, or use of iterative reconstructive technique. Lungs are clear. No pleural effusion.        Enhanced images demonstrate normal contrast opacification thoracic aorta and   great vessels traversing the superior mediastinum. Normal contrast   opacification, normal caliber pulmonary arterial trunk. Pulmonary arterial   branch vessels normally opacify. No CT evidence for PE. No pericardial effusion. Imaged content upper abdomen appears unremarkable. CXR Results  (Last 48 hours)      None               If you feel that you have not received excellent quality care or timely care, please ask to speak to the nurse manager. Please choose us in the future for your continued health care needs. ------------------------------------------------------------------------------------------------------------  The exam and treatment you received in the Emergency Department were for an urgent problem and are not intended as complete care. It is important that you follow-up with a doctor, nurse practitioner, or physician assistant to:  (1) confirm your diagnosis,  (2) re-evaluation of changes in your illness and treatment, and  (3) for ongoing care. If your symptoms become worse or you do not improve as expected and you are unable to reach your usual health care provider, you should return to the Emergency Department. We are available 24 hours a day. Please take your discharge instructions with you when you go to your follow-up appointment. If you have any problem arranging a follow-up appointment, contact the Emergency Department immediately. If a prescription has been provided, please have it filled as soon as possible to prevent a delay in treatment. Read the entire medication instruction sheet provided to you by the pharmacy. If you have any questions or reservations about taking the medication due to side effects or interactions with other medications, please call your primary care physician or contact the ER to speak with the charge nurse.      Make an appointment with your family doctor or the physician you were referred to for follow-up of this visit as instructed on your discharge paperwork, as this is a mandatory follow-up. Return to the ER if you are unable to be seen or if you are unable to be seen in a timely manner. If you have any problem arranging the follow-up visit, contact the Emergency Department immediately.

## 2022-01-10 NOTE — ED TRIAGE NOTES
49 yr old. Chest tightness on left side that has been going on for over a month.  Pt with cardiologist. Pt with negative stress test.  Pt positive with covid last week

## 2022-03-18 PROBLEM — G45.9 TIA (TRANSIENT ISCHEMIC ATTACK): Status: ACTIVE | Noted: 2021-05-01

## 2022-03-30 ENCOUNTER — HOSPITAL ENCOUNTER (EMERGENCY)
Age: 57
Discharge: HOME | End: 2022-03-30
Attending: EMERGENCY MEDICINE
Payer: MEDICAID

## 2022-03-30 VITALS
OXYGEN SATURATION: 100 % | SYSTOLIC BLOOD PRESSURE: 115 MMHG | TEMPERATURE: 98.2 F | DIASTOLIC BLOOD PRESSURE: 61 MMHG | RESPIRATION RATE: 22 BRPM | HEIGHT: 66 IN | WEIGHT: 152 LBS | BODY MASS INDEX: 24.43 KG/M2 | HEART RATE: 67 BPM

## 2022-03-30 DIAGNOSIS — B34.9 VIRAL ILLNESS: Primary | ICD-10-CM

## 2022-03-30 DIAGNOSIS — J06.9 VIRAL UPPER RESPIRATORY TRACT INFECTION: ICD-10-CM

## 2022-03-30 LAB
APPEARANCE UR: CLEAR
BILIRUB UR QL: NEGATIVE
COLOR UR: YELLOW
DEPRECATED S PYO AG THROAT QL EIA: NEGATIVE
FLUAV AG NPH QL IA: NEGATIVE
FLUBV AG NOSE QL IA: NEGATIVE
GLUCOSE UR STRIP.AUTO-MCNC: NEGATIVE MG/DL
HGB UR QL STRIP: NEGATIVE
KETONES UR QL STRIP.AUTO: NEGATIVE MG/DL
LEUKOCYTE ESTERASE UR QL STRIP.AUTO: NEGATIVE
NITRITE UR QL STRIP.AUTO: NEGATIVE
PH UR STRIP: >8.5 [PH] (ref 5–8)
PROT UR STRIP-MCNC: NEGATIVE MG/DL
SP GR UR REFRACTOMETRY: 1.01 (ref 1–1.03)
UROBILINOGEN UR QL STRIP.AUTO: 0.1 EU/DL (ref 0.2–1)

## 2022-03-30 PROCEDURE — 87804 INFLUENZA ASSAY W/OPTIC: CPT

## 2022-03-30 PROCEDURE — 81003 URINALYSIS AUTO W/O SCOPE: CPT

## 2022-03-30 PROCEDURE — 87880 STREP A ASSAY W/OPTIC: CPT

## 2022-03-30 PROCEDURE — 99283 EMERGENCY DEPT VISIT LOW MDM: CPT

## 2022-03-30 RX ORDER — IBUPROFEN 400 MG/1
400 TABLET ORAL
Qty: 20 TABLET | Refills: 0 | Status: SHIPPED | OUTPATIENT
Start: 2022-03-30

## 2022-03-30 NOTE — ED PROVIDER NOTES
EMERGENCY DEPARTMENT HISTORY AND PHYSICAL EXAM      Date: 3/30/2022  Patient Name: Ivonne Younger    History of Presenting Illness     Chief Complaint   Patient presents with    Flu Like Symptoms       History Provided By: Patient    HPI: Ivonne Younger, 49 y.o. female with a past medical history significant No significant past medical history presents to the ED with cc of Body aches, sore throat, dysuria and cough. Started 3 days ago. Daughter has strept throat. Took some Tylenol with no reloef this am. Not CoVID Vaccinated. Denes ageusia, anosmia. There are no other complaints, changes, or physical findings at this time. PCP: Mariia Zuleta MD    No current facility-administered medications on file prior to encounter. Current Outpatient Medications on File Prior to Encounter   Medication Sig Dispense Refill    albuterol (PROVENTIL HFA, VENTOLIN HFA, PROAIR HFA) 90 mcg/actuation inhaler Take 2 Puffs by inhalation every four (4) hours as needed for Wheezing. 1 Each 0    aspirin 81 mg chewable tablet Take 1 Tab by mouth daily. 30 Tab 0    atorvastatin (Lipitor) 40 mg tablet Take 1 Tab by mouth daily. 30 Tab 0       Past History     Past Medical History:  Past Medical History:   Diagnosis Date    Chest pain     Urinary tract infection        Past Surgical History:  No past surgical history on file. Family History:  Family History   Problem Relation Age of Onset    No Known Problems Mother     No Known Problems Father        Social History:  Social History     Tobacco Use    Smoking status: Current Every Day Smoker     Packs/day: 0.50    Smokeless tobacco: Never Used   Substance Use Topics    Alcohol use: Never    Drug use: Never       Allergies:  No Known Allergies      Review of Systems   Review of Systems   Constitutional: Positive for appetite change and chills. HENT: Positive for sore throat. Eyes: Negative. Respiratory: Positive for cough. Cardiovascular: Negative. Genitourinary: Negative. Neurological: Positive for headaches. All other systems reviewed and are negative. Physical Exam     Physical Exam  Constitutional:       General: She is not in acute distress. Appearance: Normal appearance. She is not ill-appearing. HENT:      Head: Normocephalic and atraumatic. Cardiovascular:      Rate and Rhythm: Normal rate and regular rhythm. Pulses: Normal pulses. Heart sounds: Normal heart sounds. Pulmonary:      Effort: Pulmonary effort is normal.      Breath sounds: Normal breath sounds. Abdominal:      Palpations: Abdomen is soft. Tenderness: There is no abdominal tenderness. Musculoskeletal:         General: Normal range of motion. Cervical back: Normal range of motion and neck supple. Neurological:      General: No focal deficit present. Mental Status: She is oriented to person, place, and time.          Lab and Diagnostic Study Results     Labs -     Recent Results (from the past 12 hour(s))   URINALYSIS W/ RFLX MICROSCOPIC    Collection Time: 03/30/22  7:30 AM   Result Value Ref Range    Color Yellow      Appearance Clear Clear      Specific gravity 1.015 1.003 - 1.030      pH (UA) >8.5 (H) 5.0 - 8.0    Protein Negative Negative mg/dL    Glucose Negative Negative mg/dL    Ketone Negative Negative mg/dL    Bilirubin Negative Negative      Blood Negative Negative      Urobilinogen 0.1 (L) 0.2 - 1.0 EU/dL    Nitrites Negative Negative      Leukocyte Esterase Negative Negative     INFLUENZA A & B AG (RAPID TEST)    Collection Time: 03/30/22  7:30 AM   Result Value Ref Range    Influenza A Antigen Negative Negative      Influenza B Antigen Negative Negative     STREP AG SCREEN, GROUP A    Collection Time: 03/30/22  7:30 AM    Specimen: Throat   Result Value Ref Range    Group A Strep Ag ID Negative         Radiologic Studies -   @lastxrresult@  CT Results  (Last 48 hours)    None        CXR Results  (Last 48 hours)    None Medical Decision Making   - I am the first provider for this patient. - I reviewed the vital signs, available nursing notes, past medical history, past surgical history, family history and social history. - Initial assessment performed. The patients presenting problems have been discussed, and they are in agreement with the care plan formulated and outlined with them. I have encouraged them to ask questions as they arise throughout their visit. Vital Signs-Reviewed the patient's vital signs. Patient Vitals for the past 12 hrs:   Temp Pulse Resp BP SpO2   03/30/22 0714 98.2 °F (36.8 °C) 67 22 115/61 100 %       Records Reviewed: Nursing Notes    The patient presents with       ED Course:          Provider Notes (Medical Decision Making): MDM       Procedures   Medical Decision Makingedical Decision Making  Performed by: Aayush Dixon MD  PROCEDURES:  Procedures       Disposition   Disposition: DC- Adult Discharges: All of the diagnostic tests were reviewed and questions answered. Diagnosis, care plan and treatment options were discussed. The patient understands the instructions and will follow up as directed. The patients results have been reviewed with them. They have been counseled regarding their diagnosis. The patient verbally convey understanding and agreement of the signs, symptoms, diagnosis, treatment and prognosis and additionally agrees to follow up as recommended with their PCP in 24 - 48 hours. They also agree with the care-plan and convey that all of their questions have been answered. I have also put together some discharge instructions for them that include: 1) educational information regarding their diagnosis, 2) how to care for their diagnosis at home, as well a 3) list of reasons why they would want to return to the ED prior to their follow-up appointment, should their condition change. Discharged    DISCHARGE PLAN:  1.    Current Discharge Medication List CONTINUE these medications which have NOT CHANGED    Details   albuterol (PROVENTIL HFA, VENTOLIN HFA, PROAIR HFA) 90 mcg/actuation inhaler Take 2 Puffs by inhalation every four (4) hours as needed for Wheezing. Qty: 1 Each, Refills: 0      aspirin 81 mg chewable tablet Take 1 Tab by mouth daily. Qty: 30 Tab, Refills: 0      atorvastatin (Lipitor) 40 mg tablet Take 1 Tab by mouth daily. Qty: 30 Tab, Refills: 0           2. Follow-up Information     Follow up With Specialties Details Why Contact Info    Mela Rodgers MD Family Medicine In 1 week  [address] [place] [address]  [phone]          3. Return to ED if worse   4. Current Discharge Medication List      START taking these medications    Details   ibuprofen (MOTRIN) 400 mg tablet Take 1 Tablet by mouth every six (6) hours as needed for Pain. Qty: 20 Tablet, Refills: 0  Start date: 3/30/2022               Diagnosis     Clinical Impression:   1. Viral illness    2. Viral upper respiratory tract infection        Attestations:    Nando Estrada MD    Please note that this dictation was completed with [employer], the [employer] voice recognition software. Quite often unanticipated grammatical, syntax, homophones, and other interpretive errors are inadvertently transcribed by the computer software. Please disregard these errors. Please excuse any errors that have escaped final proofreading. Thank you.

## 2022-03-30 NOTE — Clinical Note
[facility] EMERGENCY DEPARTMENT  [address] [facility] [id number]  [phone]    Work/School Note    Date: 3/30/2022    To Whom It May concern:    Ivonne Younger was seen and treated today in the emergency room by the following provider(s):  Attending Provider: Shyanne Rainey MD.      Ivonne Younger is excused from work/school on 03/30/22 and 03/31/22. She is medically clear to return to work/school on 4/1/2022.        Sincerely,          Aye Gomez MD

## 2022-03-30 NOTE — ED TRIAGE NOTES
49 yr old not feeling good. States started 3 days ago with some back pain, achy, sore throat and then sob this am. o2 sat 100%. States she is not able to go to work today.

## 2022-03-30 NOTE — Clinical Note
[address] EMERGENCY DEPARTMENT  [address] [address]  [phone]    Work/School Note    Date: 3/30/2022    To Whom It May concern:      hCuy Alberto was seen and treated today in the emergency room by the following provider(s):  Attending Provider: Jonathan Rodgers MD.      Chuy Alberto is excused from work/school on 03/30/22. She is clear to return to work/school on 03/31/22.         Sincerely,          Kirti Child MD

## 2022-03-31 ENCOUNTER — APPOINTMENT (OUTPATIENT)
Dept: GENERAL RADIOLOGY | Age: 57
End: 2022-03-31
Attending: EMERGENCY MEDICINE
Payer: MEDICAID

## 2022-03-31 ENCOUNTER — APPOINTMENT (OUTPATIENT)
Dept: CT IMAGING | Age: 57
End: 2022-03-31
Attending: EMERGENCY MEDICINE
Payer: MEDICAID

## 2022-03-31 ENCOUNTER — HOSPITAL ENCOUNTER (EMERGENCY)
Age: 57
Discharge: HOME | End: 2022-03-31
Attending: EMERGENCY MEDICINE
Payer: MEDICAID

## 2022-03-31 VITALS
WEIGHT: 152 LBS | TEMPERATURE: 98.1 F | BODY MASS INDEX: 24.43 KG/M2 | HEIGHT: 66 IN | RESPIRATION RATE: 16 BRPM | HEART RATE: 67 BPM | SYSTOLIC BLOOD PRESSURE: 149 MMHG | OXYGEN SATURATION: 100 % | DIASTOLIC BLOOD PRESSURE: 81 MMHG

## 2022-03-31 DIAGNOSIS — F41.1 ANXIETY STATE: ICD-10-CM

## 2022-03-31 DIAGNOSIS — R07.9 CHEST PAIN, UNSPECIFIED TYPE: Primary | ICD-10-CM

## 2022-03-31 LAB
ALBUMIN SERPL-MCNC: 4 G/DL (ref 3.5–5)
ALBUMIN/GLOB SERPL: 1.2 {RATIO} (ref 1.1–2.2)
ALP SERPL-CCNC: 78 U/L (ref 45–117)
ALT SERPL-CCNC: 18 U/L (ref 12–78)
ANION GAP SERPL CALC-SCNC: 8 MMOL/L (ref 5–15)
AST SERPL W P-5'-P-CCNC: 15 U/L (ref 15–37)
BASOPHILS # BLD: 0 K/UL (ref 0–0.1)
BASOPHILS NFR BLD: 0 % (ref 0–1)
BILIRUB SERPL-MCNC: 0.8 MG/DL (ref 0.2–1)
BUN SERPL-MCNC: 12 MG/DL (ref 6–20)
BUN/CREAT SERPL: 13 (ref 12–20)
CA-I BLD-MCNC: 9.3 MG/DL (ref 8.5–10.1)
CHLORIDE SERPL-SCNC: 111 MMOL/L (ref 97–108)
CO2 SERPL-SCNC: 21 MMOL/L (ref 21–32)
CREAT SERPL-MCNC: 0.93 MG/DL (ref 0.55–1.02)
DIFFERENTIAL METHOD BLD: NORMAL
EOSINOPHIL # BLD: 0.1 K/UL (ref 0–0.4)
EOSINOPHIL NFR BLD: 1 % (ref 0–7)
ERYTHROCYTE [DISTWIDTH] IN BLOOD BY AUTOMATED COUNT: 12.3 % (ref 11.5–14.5)
GLOBULIN SER CALC-MCNC: 3.3 G/DL (ref 2–4)
GLUCOSE SERPL-MCNC: 95 MG/DL (ref 65–100)
HCT VFR BLD AUTO: 39.4 % (ref 35–47)
HGB BLD-MCNC: 13.4 G/DL (ref 11.5–16)
IMM GRANULOCYTES # BLD AUTO: 0 K/UL (ref 0–0.04)
IMM GRANULOCYTES NFR BLD AUTO: 0 % (ref 0–0.5)
LYMPHOCYTES # BLD: 1.7 K/UL (ref 0.8–3.5)
LYMPHOCYTES NFR BLD: 24 % (ref 12–49)
MCH RBC QN AUTO: 31.2 PG (ref 26–34)
MCHC RBC AUTO-ENTMCNC: 34 G/DL (ref 30–36.5)
MCV RBC AUTO: 91.6 FL (ref 80–99)
MONOCYTES # BLD: 0.4 K/UL (ref 0–1)
MONOCYTES NFR BLD: 5 % (ref 5–13)
NEUTS SEG # BLD: 5 K/UL (ref 1.8–8)
NEUTS SEG NFR BLD: 70 % (ref 32–75)
NRBC # BLD: 0 K/UL (ref 0–0.01)
NRBC BLD-RTO: 0 PER 100 WBC
PLATELET # BLD AUTO: 197 K/UL (ref 150–400)
PMV BLD AUTO: 10.9 FL (ref 8.9–12.9)
POTASSIUM SERPL-SCNC: 3.6 MMOL/L (ref 3.5–5.1)
PROT SERPL-MCNC: 7.3 G/DL (ref 6.4–8.2)
RBC # BLD AUTO: 4.3 M/UL (ref 3.8–5.2)
SODIUM SERPL-SCNC: 140 MMOL/L (ref 136–145)
TROPONIN-HIGH SENSITIVITY: 7 NG/L (ref 0–51)
WBC # BLD AUTO: 7.1 K/UL (ref 3.6–11)

## 2022-03-31 PROCEDURE — 84484 ASSAY OF TROPONIN QUANT: CPT

## 2022-03-31 PROCEDURE — 99285 EMERGENCY DEPT VISIT HI MDM: CPT

## 2022-03-31 PROCEDURE — 36415 COLL VENOUS BLD VENIPUNCTURE: CPT

## 2022-03-31 PROCEDURE — 93005 ELECTROCARDIOGRAM TRACING: CPT

## 2022-03-31 PROCEDURE — 85025 COMPLETE CBC W/AUTO DIFF WBC: CPT

## 2022-03-31 PROCEDURE — 80053 COMPREHEN METABOLIC PANEL: CPT

## 2022-03-31 PROCEDURE — 74011250637 HC RX REV CODE- 250/637: Performed by: EMERGENCY MEDICINE

## 2022-03-31 PROCEDURE — 71045 X-RAY EXAM CHEST 1 VIEW: CPT

## 2022-03-31 RX ORDER — GUAIFENESIN 100 MG/5ML
324 LIQUID (ML) ORAL
Status: COMPLETED | OUTPATIENT
Start: 2022-03-31 | End: 2022-03-31

## 2022-03-31 RX ADMIN — ASPIRIN 81 MG 324 MG: 81 TABLET ORAL at 14:34

## 2022-03-31 NOTE — DISCHARGE INSTRUCTIONS
Thank you! Thank you for allowing me to care for you in the emergency department. I sincerely hope that you are satisfied with your visit today. It is my goal to provide you with excellent care. Below you will find a list of your labs and imaging from your visit today. Should you have any questions regarding these results please do not hesitate to call the emergency department. Labs -     Recent Results (from the past 12 hour(s))   CBC WITH AUTOMATED DIFF    Collection Time: 03/31/22  2:16 PM   Result Value Ref Range    WBC 7.1 3.6 - 11.0 K/uL    RBC 4.30 3.80 - 5.20 M/uL    HGB 13.4 11.5 - 16.0 g/dL    HCT 39.4 35.0 - 47.0 %    MCV 91.6 80.0 - 99.0 FL    MCH 31.2 26.0 - 34.0 PG    MCHC 34.0 30.0 - 36.5 g/dL    RDW 12.3 11.5 - 14.5 %    PLATELET 741 522 - 502 K/uL    MPV 10.9 8.9 - 12.9 FL    NRBC 0.0 0.0  WBC    ABSOLUTE NRBC 0.00 0.00 - 0.01 K/uL    NEUTROPHILS 70 32 - 75 %    LYMPHOCYTES 24 12 - 49 %    MONOCYTES 5 5 - 13 %    EOSINOPHILS 1 0 - 7 %    BASOPHILS 0 0 - 1 %    IMMATURE GRANULOCYTES 0 0 - 0.5 %    ABS. NEUTROPHILS 5.0 1.8 - 8.0 K/UL    ABS. LYMPHOCYTES 1.7 0.8 - 3.5 K/UL    ABS. MONOCYTES 0.4 0.0 - 1.0 K/UL    ABS. EOSINOPHILS 0.1 0.0 - 0.4 K/UL    ABS. BASOPHILS 0.0 0.0 - 0.1 K/UL    ABS. IMM. GRANS. 0.0 0.00 - 0.04 K/UL    DF AUTOMATED     METABOLIC PANEL, COMPREHENSIVE    Collection Time: 03/31/22  2:16 PM   Result Value Ref Range    Sodium 140 136 - 145 mmol/L    Potassium 3.6 3.5 - 5.1 mmol/L    Chloride 111 (H) 97 - 108 mmol/L    CO2 21 21 - 32 mmol/L    Anion gap 8 5 - 15 mmol/L    Glucose 95 65 - 100 mg/dL    BUN 12 6 - 20 mg/dL    Creatinine 0.93 0.55 - 1.02 mg/dL    BUN/Creatinine ratio 13 12 - 20      GFR est AA >60 >60 ml/min/1.73m2    GFR est non-AA >60 >60 ml/min/1.73m2    Calcium 9.3 8.5 - 10.1 mg/dL    Bilirubin, total 0.8 0.2 - 1.0 mg/dL    AST (SGOT) 15 15 - 37 U/L    ALT (SGPT) 18 12 - 78 U/L    Alk.  phosphatase 78 45 - 117 U/L    Protein, total 7.3 6.4 - 8.2 g/dL Albumin 4.0 3.5 - 5.0 g/dL    Globulin 3.3 2.0 - 4.0 g/dL    A-G Ratio 1.2 1.1 - 2.2     TROPONIN-HIGH SENSITIVITY    Collection Time: 03/31/22  2:16 PM   Result Value Ref Range    Troponin-High Sensitivity 7 0 - 51 ng/L       Radiologic Studies -   XR CHEST PORT   Final Result      No acute abnormality. CT HEAD WO CONT    (Results Pending)     CT Results  (Last 48 hours)      None          CXR Results  (Last 48 hours)                 03/31/22 1449  XR CHEST PORT Final result    Impression:      No acute abnormality. Narrative: Indication: Chest pain. AP portable upright chest radiograph 31 March 2022. Clear lungs. Normal heart size. No pneumothorax or pleural effusion. Thoracic spine curvature. If you feel that you have not received excellent quality care or timely care, please ask to speak to the nurse manager. Please choose us in the future for your continued health care needs. ------------------------------------------------------------------------------------------------------------  The exam and treatment you received in the Emergency Department were for an urgent problem and are not intended as complete care. It is important that you follow-up with a doctor, nurse practitioner, or physician assistant to:  (1) confirm your diagnosis,  (2) re-evaluation of changes in your illness and treatment, and  (3) for ongoing care. If your symptoms become worse or you do not improve as expected and you are unable to reach your usual health care provider, you should return to the Emergency Department. We are available 24 hours a day. Please take your discharge instructions with you when you go to your follow-up appointment. If you have any problem arranging a follow-up appointment, contact the Emergency Department immediately. If a prescription has been provided, please have it filled as soon as possible to prevent a delay in treatment.  Read the entire medication instruction sheet provided to you by the pharmacy. If you have any questions or reservations about taking the medication due to side effects or interactions with other medications, please call your primary care physician or contact the ER to speak with the charge nurse. Make an appointment with your family doctor or the physician you were referred to for follow-up of this visit as instructed on your discharge paperwork, as this is a mandatory follow-up. Return to the ER if you are unable to be seen or if you are unable to be seen in a timely manner. If you have any problem arranging the follow-up visit, contact the Emergency Department immediately.

## 2022-03-31 NOTE — Clinical Note
[facility] EMERGENCY DEPT  [address] [place] [id number] [phone]    Work/School Note    Date: 3/31/2022    To Whom It May concern:      Onel Juarez was seen and treated today in the emergency room by the following provider(s):  Attending Provider: Ayse Stokes DO. Onel Juarez is excused from work/school on 03/31/22. She is clear to return to work/school on 04/01/22.         Sincerely,          Tammy Gates DO

## 2022-03-31 NOTE — Clinical Note
[facility] EMERGENCY DEPT  [address] [place] [address] [place] [address]  [phone]    Work/School Note    Date: 3/31/2022    To Whom It May concern:      Kemi Villalba was seen and treated today in the emergency room by the following provider(s):  Attending Provider: Roseanna Croft DO. Kemi Villalba is excused from work/school on 03/31/22. She is clear to return to work/school on 04/01/22.         Sincerely,          Maribel Martinez RN

## 2022-04-01 LAB
ATRIAL RATE: 62 BPM
CALCULATED P AXIS, ECG09: 63 DEGREES
CALCULATED R AXIS, ECG10: 48 DEGREES
CALCULATED T AXIS, ECG11: 7 DEGREES
DIAGNOSIS, 93000: NORMAL
P-R INTERVAL, ECG05: 174 MS
Q-T INTERVAL, ECG07: 426 MS
QRS DURATION, ECG06: 76 MS
QTC CALCULATION (BEZET), ECG08: 432 MS
VENTRICULAR RATE, ECG03: 62 BPM

## 2022-04-01 NOTE — ED PROVIDER NOTES
EMERGENCY DEPARTMENT HISTORY AND PHYSICAL EXAM      Date: 3/31/2022  Patient Name: Rosemary Sow      History of Presenting Illness     Chief Complaint   Patient presents with    Chest Pain       History Provided By: Patient    HPI: Rosemary Sow, 49 y.o. female with a past medical history significant Anxiety presents to the ED with cc of chest pain. Patient reporting chest pain radiating from her left shoulder to her right shoulder with associated shortness of breath and arm tingling. She has had these symptoms previously with a diagnosis of a musculoskeletal etiology of her symptoms. Patient states that she has been seen by cardiology for echocardiogram with reportedly normal results. Symptoms have improved by time of arrival to the emergency department. There are no other complaints, changes, or physical findings at this time. PCP: Lauren Knight MD    Current Outpatient Medications   Medication Sig Dispense Refill    ibuprofen (MOTRIN) 400 mg tablet Take 1 Tablet by mouth every six (6) hours as needed for Pain. 20 Tablet 0    albuterol (PROVENTIL HFA, VENTOLIN HFA, PROAIR HFA) 90 mcg/actuation inhaler Take 2 Puffs by inhalation every four (4) hours as needed for Wheezing. 1 Each 0    aspirin 81 mg chewable tablet Take 1 Tab by mouth daily. 30 Tab 0    atorvastatin (Lipitor) 40 mg tablet Take 1 Tab by mouth daily. 30 Tab 0       Past History     Past Medical History:  Past Medical History:   Diagnosis Date    Chest pain     Urinary tract infection        Past Surgical History:  No past surgical history on file.     Family History:  Family History   Problem Relation Age of Onset    No Known Problems Mother     No Known Problems Father        Social History:  Social History     Tobacco Use    Smoking status: Current Every Day Smoker     Packs/day: 0.50    Smokeless tobacco: Never Used   Substance Use Topics    Alcohol use: Never    Drug use: Never       Allergies:  No Known Allergies      Review of Systems     Review of Systems   Constitutional: Negative for chills and fever. HENT: Negative for congestion and sore throat. Eyes: Negative for pain and visual disturbance. Respiratory: Negative for cough and shortness of breath. Cardiovascular: Positive for chest pain. Negative for palpitations. Gastrointestinal: Negative for constipation, diarrhea, nausea and vomiting. Genitourinary: Negative for dysuria and frequency. Musculoskeletal: Negative for arthralgias and myalgias. Skin: Negative for color change and rash. Neurological: Positive for numbness. Negative for dizziness, weakness, light-headedness and headaches. Psychiatric/Behavioral: Negative for dysphoric mood and sleep disturbance. The patient is nervous/anxious. Physical Exam     Physical Exam  Constitutional:       Appearance: Normal appearance. HENT:      Head: Normocephalic and atraumatic. Right Ear: External ear normal.      Left Ear: External ear normal.      Nose: Nose normal.      Mouth/Throat:      Mouth: Mucous membranes are moist.   Eyes:      Extraocular Movements: Extraocular movements intact. Conjunctiva/sclera: Conjunctivae normal.   Cardiovascular:      Rate and Rhythm: Normal rate and regular rhythm. Pulses: Normal pulses. Heart sounds: Normal heart sounds. Pulmonary:      Effort: Pulmonary effort is normal.      Breath sounds: Normal breath sounds. Abdominal:      General: Abdomen is flat. There is no distension. Palpations: Abdomen is soft. Tenderness: There is no abdominal tenderness. Musculoskeletal:         General: Normal range of motion. Cervical back: Normal range of motion. Skin:     General: Skin is warm and dry. Capillary Refill: Capillary refill takes less than 2 seconds. Neurological:      General: No focal deficit present. Mental Status: She is alert and oriented to person, place, and time.  Mental status is at baseline. Psychiatric:         Mood and Affect: Mood is anxious. Behavior: Behavior normal.         Lab and Diagnostic Study Results     Labs -     Recent Results (from the past 12 hour(s))   CBC WITH AUTOMATED DIFF    Collection Time: 03/31/22  2:16 PM   Result Value Ref Range    WBC 7.1 3.6 - 11.0 K/uL    RBC 4.30 3.80 - 5.20 M/uL    HGB 13.4 11.5 - 16.0 g/dL    HCT 39.4 35.0 - 47.0 %    MCV 91.6 80.0 - 99.0 FL    MCH 31.2 26.0 - 34.0 PG    MCHC 34.0 30.0 - 36.5 g/dL    RDW 12.3 11.5 - 14.5 %    PLATELET 150 817 - 916 K/uL    MPV 10.9 8.9 - 12.9 FL    NRBC 0.0 0.0  WBC    ABSOLUTE NRBC 0.00 0.00 - 0.01 K/uL    NEUTROPHILS 70 32 - 75 %    LYMPHOCYTES 24 12 - 49 %    MONOCYTES 5 5 - 13 %    EOSINOPHILS 1 0 - 7 %    BASOPHILS 0 0 - 1 %    IMMATURE GRANULOCYTES 0 0 - 0.5 %    ABS. NEUTROPHILS 5.0 1.8 - 8.0 K/UL    ABS. LYMPHOCYTES 1.7 0.8 - 3.5 K/UL    ABS. MONOCYTES 0.4 0.0 - 1.0 K/UL    ABS. EOSINOPHILS 0.1 0.0 - 0.4 K/UL    ABS. BASOPHILS 0.0 0.0 - 0.1 K/UL    ABS. IMM. GRANS. 0.0 0.00 - 0.04 K/UL    DF AUTOMATED     METABOLIC PANEL, COMPREHENSIVE    Collection Time: 03/31/22  2:16 PM   Result Value Ref Range    Sodium 140 136 - 145 mmol/L    Potassium 3.6 3.5 - 5.1 mmol/L    Chloride 111 (H) 97 - 108 mmol/L    CO2 21 21 - 32 mmol/L    Anion gap 8 5 - 15 mmol/L    Glucose 95 65 - 100 mg/dL    BUN 12 6 - 20 mg/dL    Creatinine 0.93 0.55 - 1.02 mg/dL    BUN/Creatinine ratio 13 12 - 20      GFR est AA >60 >60 ml/min/1.73m2    GFR est non-AA >60 >60 ml/min/1.73m2    Calcium 9.3 8.5 - 10.1 mg/dL    Bilirubin, total 0.8 0.2 - 1.0 mg/dL    AST (SGOT) 15 15 - 37 U/L    ALT (SGPT) 18 12 - 78 U/L    Alk.  phosphatase 78 45 - 117 U/L    Protein, total 7.3 6.4 - 8.2 g/dL    Albumin 4.0 3.5 - 5.0 g/dL    Globulin 3.3 2.0 - 4.0 g/dL    A-G Ratio 1.2 1.1 - 2.2     TROPONIN-HIGH SENSITIVITY    Collection Time: 03/31/22  2:16 PM   Result Value Ref Range    Troponin-High Sensitivity 7 0 - 51 ng/L       Radiologic Studies -   [unfilled]  CT Results  (Last 48 hours)    None        CXR Results  (Last 48 hours)               03/31/22 1449  XR CHEST PORT Final result    Impression:      No acute abnormality. Narrative: Indication: Chest pain. AP portable upright chest radiograph 31 March 2022. Clear lungs. Normal heart size. No pneumothorax or pleural effusion. Thoracic spine curvature. Medical Decision Making and ED Course   - I am the first and primary provider for this patient AND AM THE PRIMARY PROVIDER OF RECORD. - I reviewed the vital signs, available nursing notes, past medical history, past surgical history, family history and social history. - Initial assessment performed. The patients presenting problems have been discussed, and the staff are in agreement with the care plan formulated and outlined with them. I have encouraged them to ask questions as they arise throughout their visit. Vital Signs-Reviewed the patient's vital signs. Patient Vitals for the past 12 hrs:   Temp Pulse Resp BP SpO2   03/31/22 1600 98.1 °F (36.7 °C) 67 16 (!) 149/81 100 %   03/31/22 1500  (!) 59 16 124/77 (!) 14 %   03/31/22 1415 98 °F (36.7 °C) 65 14 131/81 100 %       EKG interpretation: (Preliminary): Performed at 1407, and read at 1413  Sinus rhythm with a ventricular rate of 62, , QRS 76, QTc 432 without evidence of ST depression or elevation. Normal axis      Records Reviewed: Nursing Notes    The patient presents with chest pain with a differential diagnosis of  abnormal EKG, ACS, angina, bronchitis, chest wall pain, dyspnea, GERD, pnuemonia and anxiety    ED Course:       ED Course as of 03/31/22 2030   Thu Mar 31, 2022   1512 Patient low risk Keyla Nasir for PE. [RS]   1520 Patient refusing to be discharged as she believes she had a stroke. She is wanting CT scan.   I explained to patient that her symptoms are not consistent with stroke and CT will likely not show anything, however will order at this time. [RS]   5426 Patient states that she realized that this was likely a panic attack and no longer wishes to undergo CT scan. [RS]      ED Course User Index  [RS] Oksana Huynh DO         Provider Notes (Medical Decision Making):     53-year-old female presenting to the emergency department for evaluation of chest pain with associated anxiety and paresthesias. She has been seen multiple times previously for similar symptoms which were reportedly diagnosed as musculoskeletal in etiology. On physical examination, patient is resting comfortably in bed. Vital signs are within normal limits. Patient is mildly anxious appearing. CBC, CMP, troponin, chest x-ray without significant abnormality. Suspect component of anxiety. Patient still for discharge home at this time. MDM           Consultations:       Consultations: - NONE        Procedures and Critical Care       Performed by: Dorothea Snell DO  PROCEDURES:  Procedures         Disposition     Disposition: Condition stable  DC- Adult Discharges: All of the diagnostic tests were reviewed and questions answered. Diagnosis, care plan and treatment options were discussed. The patient understands the instructions and will follow up as directed. The patients results have been reviewed with them. They have been counseled regarding their diagnosis. The patient verbally convey understanding and agreement of the signs, symptoms, diagnosis, treatment and prognosis and additionally agrees to follow up as recommended with their PCP in 24 - 48 hours. They also agree with the care-plan and convey that all of their questions have been answered.   I have also put together some discharge instructions for them that include: 1) educational information regarding their diagnosis, 2) how to care for their diagnosis at home, as well a 3) list of reasons why they would want to return to the ED prior to their follow-up appointment, should their condition change. DC-The patient was given verbal chest pain warning signs and instructions    Discharged    Remove if not discharged  DISCHARGE PLAN:  1. Cannot display discharge medications since this patient is not currently admitted. 2.   Follow-up Information     Follow up With Specialties Details Why Contact Info    Jenelle Claire MD Family Medicine Schedule an appointment as soon as possible for a visit   [place] [address]  [phone]      [address] [facility] EMERGENCY DEPT Emergency Medicine  As needed, If symptoms worsen [facility]    Griselda Martin, [address] [facility] Vascular Surgery, Cardiology, Interventional Cardiology Schedule an appointment as soon as possible for a visit   [address] [place] [address]  [phone]          3. Return to ED if worse   4. Discharge Medication List as of 3/31/2022  4:15 PM          Diagnosis     Clinical Impression:   1. Chest pain, unspecified type    2. Anxiety state        Attestations:    Francis Corcoran, DO    Please note that this dictation was completed with [employer], the computer voice recognition software. Quite often unanticipated grammatical, syntax, homophones, and other interpretive errors are inadvertently transcribed by the computer software. Please disregard these errors. Please excuse any errors that have escaped final proofreading. Thank you.

## 2022-07-18 ENCOUNTER — OFFICE VISIT (OUTPATIENT)
Dept: OBGYN CLINIC | Age: 57
End: 2022-07-18
Payer: MEDICAID

## 2022-07-18 VITALS
HEART RATE: 50 BPM | DIASTOLIC BLOOD PRESSURE: 79 MMHG | WEIGHT: 144.25 LBS | HEIGHT: 66 IN | OXYGEN SATURATION: 97 % | BODY MASS INDEX: 23.18 KG/M2 | SYSTOLIC BLOOD PRESSURE: 118 MMHG | TEMPERATURE: 97.1 F

## 2022-07-18 DIAGNOSIS — Z01.419 ROUTINE GYNECOLOGICAL EXAMINATION: Primary | ICD-10-CM

## 2022-07-18 DIAGNOSIS — Z12.4 SCREENING FOR MALIGNANT NEOPLASM OF CERVIX: ICD-10-CM

## 2022-07-18 DIAGNOSIS — Z12.31 ENCOUNTER FOR SCREENING MAMMOGRAM FOR MALIGNANT NEOPLASM OF BREAST: ICD-10-CM

## 2022-07-18 PROCEDURE — 99386 PREV VISIT NEW AGE 40-64: CPT | Performed by: OBSTETRICS & GYNECOLOGY

## 2022-07-18 RX ORDER — BUSPIRONE HYDROCHLORIDE 10 MG/1
10 TABLET ORAL 3 TIMES DAILY
COMMUNITY

## 2022-07-18 NOTE — PROGRESS NOTES
Leopoldo Crutch is a 49 y.o. female who presents today for the following:  Chief Complaint   Patient presents with    Annual Exam        No Known Allergies    Current Outpatient Medications   Medication Sig    busPIRone (BUSPAR) 10 mg tablet Take 10 mg by mouth three (3) times daily.  ibuprofen (MOTRIN) 400 mg tablet Take 1 Tablet by mouth every six (6) hours as needed for Pain.  albuterol (PROVENTIL HFA, VENTOLIN HFA, PROAIR HFA) 90 mcg/actuation inhaler Take 2 Puffs by inhalation every four (4) hours as needed for Wheezing.  aspirin 81 mg chewable tablet Take 1 Tab by mouth daily.  atorvastatin (Lipitor) 40 mg tablet Take 1 Tab by mouth daily. No current facility-administered medications for this visit.        Past Medical History:   Diagnosis Date    Chest pain     Urinary tract infection        Past Surgical History:   Procedure Laterality Date    HX  SECTION         Family History   Problem Relation Age of Onset    No Known Problems Mother     No Known Problems Father     Lung Cancer Maternal Grandmother        Social History     Socioeconomic History    Marital status:      Spouse name: Not on file    Number of children: Not on file    Years of education: Not on file    Highest education level: Not on file   Occupational History    Occupation:      Comment: [employer]   Tobacco Use    Smoking status: Current Every Day Smoker     Packs/day: 0.50    Smokeless tobacco: Never Used   Vaping Use    Vaping Use: Never used   Substance and Sexual Activity    Alcohol use: Never    Drug use: Never    Sexual activity: Not Currently   Other Topics Concern     Service Not Asked    Blood Transfusions Not Asked    Caffeine Concern Not Asked    Occupational Exposure Not Asked    Hobby Hazards Not Asked    Sleep Concern Not Asked    Stress Concern Not Asked    Weight Concern Not Asked    Special Diet Not Asked    Back Care Not Asked    Exercise Not Asked    Bike Helmet Not Asked   [address] Not Asked    Self-Exams Not Asked   Social History Narrative    Not on file     Social Determinants of Health     Financial Resource Strain:     Difficulty of Paying Living Expenses: Not on file   Food Insecurity:     Worried About Running Out of Food in the Last Year: Not on file    [place] of Food in the Last Year: Not on file   Transportation Needs:     Lack of Transportation (Medical): Not on file    Lack of Transportation (Non-Medical): Not on file   Physical Activity:     Days of Exercise per Week: Not on file    Minutes of Exercise per Session: Not on file   Stress:     Feeling of Stress : Not on file   Social Connections:     Frequency of Communication with Friends and Family: Not on file    Frequency of Social Gatherings with Friends and Family: Not on file    Attends Anabaptist Services: Not on file    Active Member of [address] [place] [address] [employer] or Organizations: Not on file    Attends Club or Organization Meetings: Not on file    Marital Status: Not on file   Intimate Partner Violence:     Fear of Current or Ex-Partner: Not on file    Emotionally Abused: Not on file    Physically Abused: Not on file    Sexually Abused: Not on file   Housing Stability:     Unable to Pay for Housing in the Last Year: Not on file    Number of [place] in the Last Year: Not on file    Unstable Housing in the Last Year: Not on file         HPI  Here today for annual exam.  Patient has no complaints. ROS   Review of Systems   Constitutional: Negative. HENT: Negative. Eyes: Negative. Respiratory: Negative. Cardiovascular: Negative. Gastrointestinal: Negative. Genitourinary: Negative. Musculoskeletal: Negative. Skin: Negative. Neurological: Negative. Endo/Heme/Allergies: Negative. Psychiatric/Behavioral: Negative.       /79 (BP 1 Location: Left upper arm, BP Patient Position: Sitting, BP Cuff Size: Adult)   Pulse (!) 50   Temp 97.1 °F (36.2 °C) (Temporal)   Ht 5' 6\" (1.676 m)   Wt 144 lb 4 oz (65.4 kg)   SpO2 97%   BMI 23.28 kg/m²    OBGyn Exam   Constitutional  · Appearance: well-nourished, well developed, alert, in no acute distress    HENT  · Head and Face: appears normal    Neck  · Inspection/Palpation: normal appearance, no masses or tenderness  · Lymph Nodes: no lymphadenopathy present  · Thyroid: gland size normal, nontender, no nodules or masses present on palpation    Breasts   Symmetric, no palpable masses, no tenderness, no skin changes, no nipple abnormality, no nipple discharge, no lymphadenopathy.     Chest  · Respiratory Effort: Even and unlabored  · Auscultation: normal breath sounds    Cardiovascular  · Heart:  · Auscultation: regular rate and rhythm without murmur    Gastrointestinal  · Abdominal Examination: abdomen non-tender to palpation, normal bowel sounds, no masses present  · Liver and spleen: no hepatomegaly present, spleen not palpable  · Hernias: no hernias identified    Genitourinary  · External Genitalia: normal appearance for age, no discharge present, no tenderness present, no inflammatory lesions present, no masses present, no atrophy present  · Vagina: normal vaginal vault without central or paravaginal defects, no discharge present, no inflammatory lesions present, no masses present  · Bladder: non-tender to palpation  · Urethra: appears normal  · Cervix: normal   · Uterus: normal size, shape and consistency  · Adnexa: no adnexal tenderness present, no adnexal masses present  · Perineum: perineum within normal limits, no evidence of trauma, no rashes or skin lesions present  · Anus: anus within normal limits, no hemorrhoids present  · Inguinal Lymph Nodes: no lymphadenopathy present    Skin  · General Inspection: no rash, no lesions identified    Neurologic/Psychiatric  · Mental Status:  · Orientation: grossly oriented to person, place and time  · Mood and Affect: mood normal, affect appropriate    No results found for this visit on 07/18/22. Orders Placed This Encounter    [facility] 3D AKBAR W MAMMO BI SCREENING INCL CAD     Standing Status:   Future     Standing Expiration Date:   7/18/2023     Order Specific Question:   Reason for Exam     Answer:   Screening    busPIRone (BUSPAR) 10 mg tablet     Sig: Take 10 mg by mouth three (3) times daily.  PAP IG, CT-NG-TV, RFX APTIMA HPV ASCUS ([id number]) ([facility])     Order Specific Question:   Pap Source? Answer:   Endocervical     Order Specific Question:   Total Hysterectomy? Answer:   No     Order Specific Question:   Supracervical Hysterectomy? Answer:   No     Order Specific Question:   Post Menopausal?     Answer:   Yes     Order Specific Question:   Hormone Therapy? Answer:   No     Order Specific Question:   IUD? Answer:   No     Order Specific Question:   Abnormal Bleeding? Answer:   No     Order Specific Question:   Pregnant     Answer:   No     Order Specific Question:   Post Partum? Answer:   No     Order Specific Question:   Pap collection method? Answer:   broom         1. Routine gynecological examination  Discussed importance of getting annual exams. Educated on breast self awareness. Encouraged healthy lifestyle (educated on the importance of healthy weight management and the significance of not smoking). Discussed the risk of osteoporosis and recommended dietary calcium as well as vitamin D. Reviewed cervical cancer screening, mammogram, bone density, colonoscopy testing guidelines. 2. Screening for malignant neoplasm of cervix    - PAP IG, CT-NG-TV, RFX APTIMA HPV ASCUS ([id number])    3. Encounter for screening mammogram for malignant neoplasm of breast    - [facility] 3D AKBAR W MAMMO BI SCREENING INCL CAD;  Future      Follow-up and Dispositions    · Return in about 1 year (around 7/18/2023) for Annual Exam.

## 2022-07-19 LAB
C TRACH RRNA CVX QL NAA+PROBE: NEGATIVE
CYTOLOGIST CVX/VAG CYTO: NORMAL
CYTOLOGY CVX/VAG DOC CYTO: NORMAL
CYTOLOGY CVX/VAG DOC THIN PREP: NORMAL
DX ICD CODE: NORMAL
LABCORP, 190119: NORMAL
Lab: NORMAL
N GONORRHOEA RRNA CVX QL NAA+PROBE: NEGATIVE
OTHER STN SPEC: NORMAL
STAT OF ADQ CVX/VAG CYTO-IMP: NORMAL
T VAGINALIS RRNA SPEC QL NAA+PROBE: NEGATIVE

## 2022-09-28 ENCOUNTER — PATIENT MESSAGE (OUTPATIENT)
Dept: OBGYN CLINIC | Age: 57
End: 2022-09-28

## 2023-03-19 ENCOUNTER — HOSPITAL ENCOUNTER (EMERGENCY)
Age: 58
Discharge: HOME | End: 2023-03-19
Attending: EMERGENCY MEDICINE
Payer: MEDICAID

## 2023-03-19 ENCOUNTER — APPOINTMENT (OUTPATIENT)
Dept: GENERAL RADIOLOGY | Age: 58
End: 2023-03-19
Attending: EMERGENCY MEDICINE
Payer: MEDICAID

## 2023-03-19 VITALS
OXYGEN SATURATION: 100 % | HEIGHT: 66 IN | BODY MASS INDEX: 22.5 KG/M2 | WEIGHT: 140 LBS | RESPIRATION RATE: 18 BRPM | DIASTOLIC BLOOD PRESSURE: 80 MMHG | TEMPERATURE: 98.4 F | SYSTOLIC BLOOD PRESSURE: 146 MMHG | HEART RATE: 78 BPM

## 2023-03-19 DIAGNOSIS — J40 COMPLICATED BRONCHITIS: Primary | ICD-10-CM

## 2023-03-19 PROCEDURE — 71045 X-RAY EXAM CHEST 1 VIEW: CPT

## 2023-03-19 PROCEDURE — 99283 EMERGENCY DEPT VISIT LOW MDM: CPT

## 2023-03-19 RX ORDER — DOXYCYCLINE HYCLATE 100 MG
100 TABLET ORAL 2 TIMES DAILY
Qty: 14 TABLET | Refills: 0 | Status: SHIPPED | OUTPATIENT
Start: 2023-03-19 | End: 2023-03-26

## 2023-03-19 RX ORDER — CODEINE PHOSPHATE AND GUAIFENESIN 10; 100 MG/5ML; MG/5ML
5 SOLUTION ORAL
Qty: 118 ML | Refills: 0 | Status: SHIPPED | OUTPATIENT
Start: 2023-03-19 | End: 2023-03-27

## 2023-03-19 RX ORDER — KETOROLAC TROMETHAMINE 10 MG/1
10 TABLET, FILM COATED ORAL
Qty: 20 TABLET | Refills: 0 | Status: SHIPPED | OUTPATIENT
Start: 2023-03-19 | End: 2023-03-24

## 2023-03-19 NOTE — Clinical Note
[address]  [address] [place] [id number]  [phone]    Work/School Note    Date: 3/19/2023    To Whom It May concern:    Kourtney Monge was seen and treated today in the emergency room by the following provider(s):  Attending Provider: Rand Hunter MD.      Kourtney Monge is excused from work/school on 3/19/2023 through 3/21/2023. She is medically clear to return to work/school on 3/22/2023.          Sincerely,          Clint Tafoya MD

## 2023-03-19 NOTE — ED PROVIDER NOTES
[facility] EMERGENCY DEPARTMENT  EMERGENCY DEPARTMENT HISTORY AND PHYSICAL EXAM      Date: 3/19/2023  Patient Name: Mag Reddy  MRN: [id number]  [place]: 1965  Date of evaluation: 3/19/2023  Provider: Amy Nicolas MD   Note Started: 10:24 AM 3/19/23    HISTORY OF PRESENT ILLNESS     Chief Complaint   Patient presents with    Cough    Flu Like Symptoms    Headache       History Provided By: Patient    HPI: Mag Reddy is a 56 y.o. female presents to the emergency department with complaint of 1 week history of cough which is increasingly productive of sputum. Patient states she was seen 5 days ago for the same at an urgent care center and tested negative for flu and COVID. Patient reports associated rhinorrhea and chills, reports diffuse headache. Patient denies chest pain or shortness of breath. PAST MEDICAL HISTORY   Past Medical History:  Past Medical History:   Diagnosis Date    Chest pain     Urinary tract infection        Past Surgical History:  Past Surgical History:   Procedure Laterality Date    HX  SECTION         Family History:  Family History   Problem Relation Age of Onset    No Known Problems Mother     No Known Problems Father     Lung Cancer Maternal Grandmother        Social History:  Social History     Tobacco Use    Smoking status: Every Day     Packs/day: 0.50     Types: Cigarettes    Smokeless tobacco: Never   Vaping Use    Vaping Use: Never used   Substance Use Topics    Alcohol use: Never    Drug use: Never       Allergies:  No Known Allergies    PCP: Lianet Norman MD    Current Meds:   Previous Medications    ALBUTEROL (PROVENTIL HFA, VENTOLIN HFA, PROAIR HFA) 90 MCG/ACTUATION INHALER    Take 2 Puffs by inhalation every four (4) hours as needed for Wheezing. ASPIRIN 81 MG CHEWABLE TABLET    Take 1 Tab by mouth daily. ATORVASTATIN (LIPITOR) 40 MG TABLET    Take 1 Tab by mouth daily.     BUSPIRONE (BUSPAR) 10 MG TABLET    Take 10 mg by mouth three (3) times daily. IBUPROFEN (MOTRIN) 400 MG TABLET    Take 1 Tablet by mouth every six (6) hours as needed for Pain. PHYSICAL EXAM     ED Triage Vitals [03/19/23 0947]   ED Encounter Vitals Group      BP (!) 146/80      Pulse (Heart Rate) 78      Resp Rate 18      Temp 98.4 °F (36.9 °C)      Temp src       O2 Sat (%) 100 %      Weight 140 lb      Height 5' 6\"      Physical Exam  Physical Exam  Constitutional:       General: No acute distress. Appearance: Normal appearance. Not toxic-appearing. HENT:      Head: Normocephalic and atraumatic. Nose: Nose normal.      Mouth/Throat:      Mouth: Mucous membranes are moist.   Eyes:      Extraocular Movements: Extraocular movements intact. Pupils: Pupils are equal, round, and reactive to light. Cardiovascular:      Rate and Rhythm: Normal rate. Pulses: Normal pulses. Pulmonary:      Effort: Pulmonary effort is normal.      Breath sounds: No stridor, clear to auscultation bilaterally  Abdominal:      General: Abdomen is flat. There is no distension. Musculoskeletal:         General: Normal range of motion. Cervical back: Normal range of motion and neck supple. Skin:     General: Skin is warm and dry. Capillary Refill: Capillary refill takes less than 2 seconds. Neurological:      General: No focal deficit present. Mental Status: Alert and oriented to person, place, and time. Psychiatric:         Mood and Affect: Mood normal.         Behavior: Behavior normal.       SCREENINGS        No data recorded      LAB, EKG AND DIAGNOSTIC RESULTS   Labs:  No results found for this or any previous visit (from the past 12 hour(s)). EKG: Initial EKG interpreted by me.  Not Applicable    Radiologic Studies:  Non-plain film images such as CT, Ultrasound and MRI are read by the radiologist. Plain radiographic images are visualized and preliminarily interpreted by the ED Physician with the following findings: Not applicable    Interpretation per the Radiologist below, if available at the time of this note:  XR CHEST PORT    Result Date: 3/19/2023  INDICATION: Cough increasingly productive of sputum, rule out PNA Portable AP view of the chest. Direct comparison made to prior chest x-ray dated March 2022. Cardiomediastinal silhouette is stable. Lungs are clear bilaterally. Pleural spaces are normal and there is no pneumothorax. There is a dextroconvex thoracolumbar scoliosis. No acute cardiopulmonary disease. PROCEDURES   Unless otherwise noted below, none. Procedures      CRITICAL CARE TIME   None    ED COURSE and DIFFERENTIAL DIAGNOSIS/MDM   CC/HPI Summary, DDx, ED Course, and Reassessment: Patient with flulike symptoms. Had a negative flu and COVID test, however given the limitations of those tests certainly could be false negatives. She is coughing with an increasing production of sputum consistent with a complicated bronchitis, possible allergy involvement as well given the symptoms and time of year. We will get screening x-ray to evaluate for pneumonia. Disposition Considerations (Tests not done, Shared Decision Making, Pt Expectation of Test or Treatment.):  Discussed treatment plan as outpatient for complicated bronchitis given the increased production of sputum during its course. Discussed need for close follow-up as well as return precautions. Records Reviewed (source and summary of external notes): Prior medical records and Nursing notes    Vitals:    Vitals:    03/19/23 0947   BP: (!) 146/80   Pulse: 78   Resp: 18   Temp: 98.4 °F (36.9 °C)   SpO2: 100%   Weight: 63.5 kg (140 lb)   Height: 5' 6\" (1.676 m)             Patient was given the following medications:  Medications - No data to display    CONSULTS: (Who and What was discussed)  None     Social Determinants affecting Dx or Tx: None    FINAL IMPRESSION     1.  Complicated bronchitis          DISPOSITION/PLAN   Discharged    Discharge Note: The patient is stable for discharge home. The signs, symptoms, diagnosis, and discharge instructions have been discussed, understanding conveyed, and agreed upon. The patient is to follow up as recommended or return to ER should their symptoms worsen. PATIENT REFERRED TO:  Follow-up Information       Follow up With Specialties Details Why Contact Info    Stevenson Mc MD Family Medicine In 2 days  [address]  [phone]                DISCHARGE MEDICATIONS:  Current Discharge Medication List        START taking these medications    Details   doxycycline (VIBRA-TABS) 100 mg tablet Take 1 Tablet by mouth two (2) times a day for 7 days. Qty: 14 Tablet, Refills: 0  Start date: 3/19/2023, End date: 3/26/2023      guaiFENesin-codeine (ROBITUSSIN AC) 100-10 mg/5 mL solution Take 5 mL by mouth three (3) times daily as needed for Cough for up to 8 days. Max Daily Amount: 15 mL. Qty: 118 mL, Refills: 0  Start date: 3/19/2023, End date: 3/27/2023    Associated Diagnoses: Complicated bronchitis      ketorolac (TORADOL) 10 mg tablet Take 1 Tablet by mouth every six (6) hours as needed for Pain for up to 5 days. Qty: 20 Tablet, Refills: 0  Start date: 3/19/2023, End date: 3/24/2023               DISCONTINUED MEDICATIONS:  Current Discharge Medication List          I am the Primary Clinician of Record: Kristopher Siemens, MD (electronically signed)    (Please note that parts of this dictation were completed with voice recognition software. Quite often unanticipated grammatical, syntax, homophones, and other interpretive errors are inadvertently transcribed by the computer software. Please disregards these errors.  Please excuse any errors that have escaped final proofreading.)

## 2023-03-19 NOTE — DISCHARGE INSTRUCTIONS
Thank you! Thank you for allowing me to care for you in the emergency department. It is my goal to provide you with excellent care. If you have not received excellent quality care, please ask to speak to the nurse manager. Please fill out the survey that will come to you by mail or email since we listen to your feedback! Below you will find a list of your tests from today's visit. Should you have any questions, please do not hesitate to call the emergency department. Labs  No results found for this or any previous visit (from the past 12 hour(s)). Radiologic Studies  XR CHEST PORT   Final Result   No acute cardiopulmonary disease. CT Results  (Last 48 hours)      None          CXR Results  (Last 48 hours)                 03/19/23 1037  XR CHEST PORT Final result    Impression:  No acute cardiopulmonary disease. Narrative:  INDICATION: Cough increasingly productive of sputum, rule out PNA       Portable AP view of the chest.       Direct comparison made to prior chest x-ray dated March 2022. Cardiomediastinal silhouette is stable. Lungs are clear bilaterally. Pleural   spaces are normal and there is no pneumothorax. There is a dextroconvex   thoracolumbar scoliosis.                 ------------------------------------------------------------------------------------------------------------  The exam and treatment you received in the Emergency Department were for an urgent problem and are not intended as complete care. It is important that you follow-up with a doctor, nurse practitioner, or physician assistant to:  (1) confirm your diagnosis,  (2) re-evaluation of changes in your illness and treatment, and  (3) for ongoing care. Please take your discharge instructions with you when you go to your follow-up appointment. If you have any problem arranging a follow-up appointment, contact the Emergency Department.   If your symptoms become worse or you do not improve as expected and you are unable to reach your health care provider, please return to the Emergency Department. We are available 24 hours a day. If a prescription has been provided, please have it filled as soon as possible to prevent a delay in treatment. If you have any questions or reservations about taking the medication due to side effects or interactions with other medications, please call your primary care provider or contact the ER.

## 2023-03-19 NOTE — ED TRIAGE NOTES
57 YO P/W 7 days productive cough(yellow phyelm) headache, runny nose, chills and low grade temp.  Denies chest pain/sob

## 2023-04-17 ENCOUNTER — HOSPITAL ENCOUNTER (EMERGENCY)
Age: 58
Discharge: HOME | End: 2023-04-17
Attending: NURSE PRACTITIONER
Payer: MEDICAID

## 2023-04-17 ENCOUNTER — APPOINTMENT (OUTPATIENT)
Dept: GENERAL RADIOLOGY | Age: 58
End: 2023-04-17
Attending: NURSE PRACTITIONER
Payer: MEDICAID

## 2023-04-17 VITALS
HEIGHT: 67 IN | DIASTOLIC BLOOD PRESSURE: 68 MMHG | BODY MASS INDEX: 21.97 KG/M2 | OXYGEN SATURATION: 100 % | WEIGHT: 140 LBS | RESPIRATION RATE: 19 BRPM | SYSTOLIC BLOOD PRESSURE: 118 MMHG | HEART RATE: 61 BPM | TEMPERATURE: 98.1 F

## 2023-04-17 DIAGNOSIS — M25.512 PAIN IN JOINT OF LEFT SHOULDER: Primary | ICD-10-CM

## 2023-04-17 DIAGNOSIS — S46.912A SHOULDER STRAIN, LEFT, INITIAL ENCOUNTER: ICD-10-CM

## 2023-04-17 PROCEDURE — 99284 EMERGENCY DEPT VISIT MOD MDM: CPT

## 2023-04-17 PROCEDURE — 96372 THER/PROPH/DIAG INJ SC/IM: CPT

## 2023-04-17 PROCEDURE — 74011250636 HC RX REV CODE- 250/636: Performed by: NURSE PRACTITIONER

## 2023-04-17 PROCEDURE — 73010 X-RAY EXAM OF SHOULDER BLADE: CPT

## 2023-04-17 PROCEDURE — 74011000250 HC RX REV CODE- 250: Performed by: NURSE PRACTITIONER

## 2023-04-17 RX ORDER — KETOROLAC TROMETHAMINE 30 MG/ML
15 INJECTION, SOLUTION INTRAMUSCULAR; INTRAVENOUS
Status: COMPLETED | OUTPATIENT
Start: 2023-04-17 | End: 2023-04-17

## 2023-04-17 RX ORDER — LIDOCAINE 4 G/100G
1 PATCH TOPICAL ONCE
Status: DISCONTINUED | OUTPATIENT
Start: 2023-04-17 | End: 2023-04-17 | Stop reason: HOSPADM

## 2023-04-17 RX ORDER — LIDOCAINE 4 G/100G
PATCH TOPICAL
Qty: 10 EACH | Refills: 0 | Status: SHIPPED | OUTPATIENT
Start: 2023-04-17

## 2023-04-17 RX ORDER — ACETAMINOPHEN 325 MG/1
650 TABLET ORAL
Qty: 20 TABLET | Refills: 0 | Status: SHIPPED | OUTPATIENT
Start: 2023-04-17

## 2023-04-17 RX ORDER — PREDNISONE 50 MG/1
50 TABLET ORAL DAILY
Qty: 3 TABLET | Refills: 0 | Status: SHIPPED | OUTPATIENT
Start: 2023-04-17 | End: 2023-04-20

## 2023-04-17 RX ADMIN — METHYLPREDNISOLONE SODIUM SUCCINATE 125 MG: 125 INJECTION, POWDER, FOR SOLUTION INTRAMUSCULAR; INTRAVENOUS at 11:41

## 2023-04-17 RX ADMIN — KETOROLAC TROMETHAMINE 15 MG: 30 INJECTION, SOLUTION INTRAMUSCULAR at 11:17

## 2023-04-17 NOTE — DISCHARGE INSTRUCTIONS
.. Thank you! Thank you for allowing me to care for you in the emergency department. It is my goal to provide you with excellent care. If you have not received excellent quality care, please ask to speak to the nurse manager. Please fill out the survey that will come to you by mail or email since we listen to your feedback! Below you will find a list of your tests from today's visit. Should you have any questions, please do not hesitate to call the emergency department. Labs  No results found for this or any previous visit (from the past 12 hour(s)). Radiologic Studies  XR SCAPULA LT   Final Result      No fracture seen        CT Results  (Last 48 hours)      None          CXR Results  (Last 48 hours)      None          ------------------------------------------------------------------------------------------------------------  The exam and treatment you received in the Emergency Department were for an urgent problem and are not intended as complete care. It is important that you follow-up with a doctor, nurse practitioner, or physician assistant to:  (1) confirm your diagnosis,  (2) re-evaluation of changes in your illness and treatment, and  (3) for ongoing care. Please take your discharge instructions with you when you go to your follow-up appointment. If you have any problem arranging a follow-up appointment, contact the Emergency Department. If your symptoms become worse or you do not improve as expected and you are unable to reach your health care provider, please return to the Emergency Department. We are available 24 hours a day. If a prescription has been provided, please have it filled as soon as possible to prevent a delay in treatment. If you have any questions or reservations about taking the medication due to side effects or interactions with other medications, please call your primary care provider or contact the ER.

## 2023-04-17 NOTE — ED PROVIDER NOTES
[facility] EMERGENCY DEPARTMENT  EMERGENCY DEPARTMENT HISTORY AND PHYSICAL EXAM      Date: 2023  Patient Name: Ruma Cope  MRN: [id number]  [place]: 1965  Date of evaluation: 2023  Provider: Manolo Ramachandran NP   Note Started: 11:20 AM 23    HISTORY OF PRESENT ILLNESS     Chief Complaint   Patient presents with    Shoulder Pain       History Provided By: Patient    HPI: Ruma Cope is a 56 y.o. female presented to the ED complaining of left shoulder pain. Patient states her pain has been ongoing for the past 2 weeks. Patient describes the pain as sharp and constant. Normal adduction and abduction, limited internal and external rotation. Patient states she has applied heat, lidocaine patches, and has taken Ibuprofen without symptoms relief. Pain was worse this am prompting her to seek medical treatment. She is a  and is lifting while at work. Denies recent injury or surgeries in the past.     PAST MEDICAL HISTORY   Past Medical History:  Past Medical History:   Diagnosis Date    Chest pain     Urinary tract infection        Past Surgical History:  Past Surgical History:   Procedure Laterality Date    HX  SECTION         Family History:  Family History   Problem Relation Age of Onset    No Known Problems Mother     No Known Problems Father     Lung Cancer Maternal Grandmother        Social History:  Social History     Tobacco Use    Smoking status: Every Day     Packs/day: 0.50     Types: Cigarettes    Smokeless tobacco: Never   Vaping Use    Vaping Use: Never used   Substance Use Topics    Alcohol use: Never    Drug use: Never       Allergies:  No Known Allergies    PCP: Maday iTnoco MD    Current Meds:   Previous Medications    ALBUTEROL (PROVENTIL HFA, VENTOLIN HFA, PROAIR HFA) 90 MCG/ACTUATION INHALER    Take 2 Puffs by inhalation every four (4) hours as needed for Wheezing. ASPIRIN 81 MG CHEWABLE TABLET    Take 1 Tab by mouth daily.     ATORVASTATIN (LIPITOR) 40 MG TABLET Take 1 Tab by mouth daily. BUSPIRONE (BUSPAR) 10 MG TABLET    Take 10 mg by mouth three (3) times daily. IBUPROFEN (MOTRIN) 400 MG TABLET    Take 1 Tablet by mouth every six (6) hours as needed for Pain. PHYSICAL EXAM     ED Triage Vitals [04/17/23 1059]   ED Encounter Vitals Group      /68      Pulse (Heart Rate) 61      Resp Rate 19      Temp 98.1 °F (36.7 °C)      Temp src       O2 Sat (%) 100 %      Weight 140 lb      Height 5' 7\"      Physical Exam  Vitals and nursing note reviewed. Constitutional:       Appearance: Normal appearance. She is normal weight. Cardiovascular:      Rate and Rhythm: Normal rate and regular rhythm. Pulses: Normal pulses. Heart sounds: Normal heart sounds. Pulmonary:      Effort: Pulmonary effort is normal.      Breath sounds: Normal breath sounds. Musculoskeletal:         General: Tenderness present. Comments: Tenderness, no swelling, or warmth   Skin:     General: Skin is warm and dry. Neurological:      Mental Status: She is alert. SCREENINGS              LAB, EKG AND DIAGNOSTIC RESULTS   Labs:  No results found for this or any previous visit (from the past 12 hour(s)). EKG: Initial EKG interpreted by me. Not Applicable    Radiologic Studies:  Non-plain film images such as CT, Ultrasound and MRI are read by the radiologist. Plain radiographic images are visualized and preliminarily interpreted by the ED Physician with the following findings: Not Applicable    Interpretation per the Radiologist below, if available at the time of this note:  XR SCAPULA LT    Result Date: 4/17/2023  Left scapula radiograph HISTORY: Left scapular pain 2 views of the left scapula demonstrate no fracture or dislocation. No fracture seen      PROCEDURES   Unless otherwise noted below, none.   Procedures      CRITICAL CARE TIME   Patient does not meet Critical Care Time, 0 minutes    ED COURSE and DIFFERENTIAL DIAGNOSIS/MDM   CC/HPI/PE Summary, DDx: Patient presents with shoulder pain. Ddx: dislocation, fracture, strain, AC separation, clavicle fracture. Will get XR to further evaluate and treat the pain. For the shoulder problem, she is discharged with a sling and asked to apply heat for 10-15 minutes four times a day followed by passive pendulum range of motion exercises to prevent frozen shoulder. Patient  may use small weight in the hand if desired and tolerated. In the future, warm up and stretch the shoulder prior to exercising in a similar fashion. If any worsening symptoms, can follow up with Sports medicine. Records Reviewed (source and summary of external notes): Prior medical records and Nursing notes    Vitals:    Vitals:    04/17/23 1059   BP: 118/68   Pulse: 61   Resp: 19   Temp: 98.1 °F (36.7 °C)   SpO2: 100%   Weight: 63.5 kg (140 lb)   Height: 5' 7\" (1.702 m)        ED COURSE       Disposition Considerations (Tests not done, Shared Decision Making, Pt Expectation of Test or Treatment.): Not Applicable    Patient was given the following medications:  Medications   lidocaine 4 % patch 1 Patch (1 Patch TransDERmal Apply Patch 4/17/23 1117)   ketorolac (TORADOL) injection 15 mg (15 mg IntraMUSCular Given 4/17/23 1117)   methylPREDNISolone (PF) (SOLU-MEDROL) injection 125 mg (125 mg IntraMUSCular Given 4/17/23 1141)       CONSULTS: (Who and What was discussed)  None     Social Determinants affecting Dx or Tx: None    Smoking Cessation: Not Applicable    FINAL IMPRESSION     1. Pain in joint of left shoulder    2. Shoulder strain, left, initial encounter          DISPOSITION/PLAN   Discharged    Discharge Note: The patient is stable for discharge home. The signs, symptoms, diagnosis, and discharge instructions have been discussed, understanding conveyed, and agreed upon. The patient is to follow up as recommended or return to ER should their symptoms worsen.       PATIENT REFERRED TO:  Follow-up Information       Follow up With Specialties Details Why Contact Info    Sony Kohli MD Family Medicine  If symptoms worsen [address] [place]  [phone]      Merary Chambers MD Orthopedic Surgery  If symptoms worsen [address] [facility]  [facility] [address] [place] [id number] [phone]                DISCHARGE MEDICATIONS:  Current Discharge Medication List        START taking these medications    Details   predniSONE (DELTASONE) 50 mg tablet Take 1 Tablet by mouth daily for 3 days. Qty: 3 Tablet, Refills: 0  Start date: 4/17/2023, End date: 4/20/2023      acetaminophen (TYLENOL) 325 mg tablet Take 2 Tablets by mouth every four (4) hours as needed for Pain. Qty: 20 Tablet, Refills: 0  Start date: 4/17/2023               DISCONTINUED MEDICATIONS:  Current Discharge Medication List          I am the Primary Clinician of Record: Jimmie Gutierrez NP (electronically signed)    (Please note that parts of this dictation were completed with voice recognition software. Quite often unanticipated grammatical, syntax, homophones, and other interpretive errors are inadvertently transcribed by the computer software. Please disregards these errors.  Please excuse any errors that have escaped final proofreading.)

## 2023-08-29 ENCOUNTER — HOSPITAL ENCOUNTER (EMERGENCY)
Facility: HOSPITAL | Age: 58
Discharge: HOME | End: 2023-08-29
Payer: MEDICAID

## 2023-08-29 ENCOUNTER — APPOINTMENT (OUTPATIENT)
Facility: HOSPITAL | Age: 58
End: 2023-08-29
Payer: MEDICAID

## 2023-08-29 VITALS
TEMPERATURE: 97.7 F | SYSTOLIC BLOOD PRESSURE: 132 MMHG | DIASTOLIC BLOOD PRESSURE: 72 MMHG | OXYGEN SATURATION: 98 % | BODY MASS INDEX: 23.86 KG/M2 | WEIGHT: 152 LBS | HEART RATE: 71 BPM | RESPIRATION RATE: 20 BRPM | HEIGHT: 67 IN

## 2023-08-29 DIAGNOSIS — R51.9 ACUTE NONINTRACTABLE HEADACHE, UNSPECIFIED HEADACHE TYPE: ICD-10-CM

## 2023-08-29 DIAGNOSIS — J06.9 VIRAL URI WITH COUGH: Primary | ICD-10-CM

## 2023-08-29 LAB
FLUAV AG NPH QL IA: NEGATIVE
FLUBV AG NOSE QL IA: NEGATIVE

## 2023-08-29 PROCEDURE — 6360000002 HC RX W HCPCS

## 2023-08-29 PROCEDURE — 99284 EMERGENCY DEPT VISIT MOD MDM: CPT

## 2023-08-29 PROCEDURE — 87804 INFLUENZA ASSAY W/OPTIC: CPT

## 2023-08-29 PROCEDURE — 71046 X-RAY EXAM CHEST 2 VIEWS: CPT

## 2023-08-29 PROCEDURE — 96372 THER/PROPH/DIAG INJ SC/IM: CPT

## 2023-08-29 PROCEDURE — 87635 SARS-COV-2 COVID-19 AMP PRB: CPT

## 2023-08-29 RX ORDER — NAPROXEN 500 MG/1
500 TABLET ORAL 2 TIMES DAILY
Qty: 60 TABLET | Refills: 0 | Status: SHIPPED | OUTPATIENT
Start: 2023-08-29

## 2023-08-29 RX ORDER — KETOROLAC TROMETHAMINE 30 MG/ML
30 INJECTION, SOLUTION INTRAMUSCULAR; INTRAVENOUS
Status: COMPLETED | OUTPATIENT
Start: 2023-08-29 | End: 2023-08-29

## 2023-08-29 RX ORDER — DEXAMETHASONE SODIUM PHOSPHATE 10 MG/ML
10 INJECTION, SOLUTION INTRAMUSCULAR; INTRAVENOUS ONCE
Status: COMPLETED | OUTPATIENT
Start: 2023-08-29 | End: 2023-08-29

## 2023-08-29 RX ORDER — BENZONATATE 100 MG/1
100 CAPSULE ORAL 2 TIMES DAILY PRN
Qty: 20 CAPSULE | Refills: 0 | Status: SHIPPED | OUTPATIENT
Start: 2023-08-29 | End: 2023-09-08

## 2023-08-29 RX ORDER — BUTALBITAL, ACETAMINOPHEN AND CAFFEINE 50; 325; 40 MG/1; MG/1; MG/1
2 TABLET ORAL ONCE
Status: DISCONTINUED | OUTPATIENT
Start: 2023-08-29 | End: 2023-08-29

## 2023-08-29 RX ORDER — LORATADINE 10 MG/1
10 TABLET ORAL DAILY
Qty: 30 TABLET | Refills: 0 | Status: SHIPPED | OUTPATIENT
Start: 2023-08-29

## 2023-08-29 RX ADMIN — KETOROLAC TROMETHAMINE 30 MG: 30 INJECTION, SOLUTION INTRAMUSCULAR; INTRAVENOUS at 09:29

## 2023-08-29 RX ADMIN — DEXAMETHASONE SODIUM PHOSPHATE 10 MG: 10 INJECTION, SOLUTION INTRAMUSCULAR; INTRAVENOUS at 09:29

## 2023-08-29 ASSESSMENT — PAIN - FUNCTIONAL ASSESSMENT: PAIN_FUNCTIONAL_ASSESSMENT: 0-10

## 2023-08-29 ASSESSMENT — LIFESTYLE VARIABLES
HOW MANY STANDARD DRINKS CONTAINING ALCOHOL DO YOU HAVE ON A TYPICAL DAY: PATIENT DOES NOT DRINK
HOW OFTEN DO YOU HAVE A DRINK CONTAINING ALCOHOL: NEVER

## 2023-08-29 ASSESSMENT — PAIN SCALES - GENERAL: PAINLEVEL_OUTOF10: 6

## 2023-08-29 NOTE — ED PROVIDER NOTES
[facility] EMERGENCY DEPT  EMERGENCY DEPARTMENT HISTORY AND PHYSICAL EXAM      Date: 2023  Patient Name: Sheldon Rao  MRN: [id number]  [address] 1965  Date of evaluation: 2023  Provider: Zoila Castro PA-C   Note Started: 9:17 AM EDT 23    HISTORY OF PRESENT ILLNESS     Chief Complaint   Patient presents with    Cough    Headache       History Provided By: Patient    HPI: Sheldon Rao is a 56 y.o. female with past medical history listed below, presents for cough, congestion, generalized body aches, headache x3 days. She attempted to go to work today, then began starting to feel worse and wanted to come in to be checked. She does work in service industry and does not know if she has had any specific exposure to viruses. Headache is intermittent, similar to previous. She denies any sudden onset, \"thunderclap sensation\", changes in vision, photophobia, worsening with exertion, neck stiffness or tenderness, fevers or chills. PAST MEDICAL HISTORY   Past Medical History:  Past Medical History:   Diagnosis Date    Chest pain     Urinary tract infection        Past Surgical History:  Past Surgical History:   Procedure Laterality Date     SECTION         Family History:  Family History   Problem Relation Age of Onset    No Known Problems Father     No Known Problems Mother     Lung Cancer Maternal Grandmother        Social History:  Social History     Tobacco Use    Smoking status: Every Day     Packs/day: 0.50     Types: Cigarettes    Smokeless tobacco: Never   Substance Use Topics    Alcohol use: Never    Drug use: Never       Allergies:  No Known Allergies    PCP: Chris Rogers MD    Current Meds:   No current facility-administered medications for this encounter.      Current Outpatient Medications   Medication Sig Dispense Refill    loratadine (CLARITIN) 10 MG tablet Take 1 tablet by mouth daily 30 tablet 0    benzonatate (TESSALON) 100 MG capsule Take 1 capsule by mouth 2 times

## 2023-08-29 NOTE — ED TRIAGE NOTES
Pt presents with cough and headache since Saturday. Pt went to work this morning and started feeling worse. Pt reports she now has off and on nausea.

## 2023-08-30 LAB
SARS-COV-2 RNA RESP QL NAA+PROBE: NOT DETECTED
SOURCE: NORMAL

## 2023-09-25 ENCOUNTER — APPOINTMENT (OUTPATIENT)
Facility: HOSPITAL | Age: 58
End: 2023-09-25
Payer: MEDICAID

## 2023-09-25 ENCOUNTER — HOSPITAL ENCOUNTER (EMERGENCY)
Facility: HOSPITAL | Age: 58
Discharge: HOME | End: 2023-09-25
Payer: MEDICAID

## 2023-09-25 VITALS
BODY MASS INDEX: 23.07 KG/M2 | DIASTOLIC BLOOD PRESSURE: 65 MMHG | HEIGHT: 67 IN | OXYGEN SATURATION: 100 % | SYSTOLIC BLOOD PRESSURE: 133 MMHG | WEIGHT: 147 LBS | RESPIRATION RATE: 18 BRPM | HEART RATE: 60 BPM | TEMPERATURE: 97.8 F

## 2023-09-25 DIAGNOSIS — S16.1XXA ACUTE STRAIN OF NECK MUSCLE, INITIAL ENCOUNTER: ICD-10-CM

## 2023-09-25 DIAGNOSIS — G44.319 ACUTE POST-TRAUMATIC HEADACHE, NOT INTRACTABLE: Primary | ICD-10-CM

## 2023-09-25 PROCEDURE — 73090 X-RAY EXAM OF FOREARM: CPT

## 2023-09-25 PROCEDURE — 99284 EMERGENCY DEPT VISIT MOD MDM: CPT

## 2023-09-25 PROCEDURE — 70450 CT HEAD/BRAIN W/O DYE: CPT

## 2023-09-25 PROCEDURE — 72125 CT NECK SPINE W/O DYE: CPT

## 2023-09-25 RX ORDER — METHOCARBAMOL 750 MG/1
750 TABLET, FILM COATED ORAL 4 TIMES DAILY
Qty: 40 TABLET | Refills: 0 | Status: SHIPPED | OUTPATIENT
Start: 2023-09-25 | End: 2023-10-05

## 2023-09-25 RX ORDER — BUTALBITAL, ACETAMINOPHEN AND CAFFEINE 50; 325; 40 MG/1; MG/1; MG/1
1 TABLET ORAL EVERY 4 HOURS PRN
Qty: 20 TABLET | Refills: 0 | Status: SHIPPED | OUTPATIENT
Start: 2023-09-25

## 2023-09-25 ASSESSMENT — PAIN - FUNCTIONAL ASSESSMENT: PAIN_FUNCTIONAL_ASSESSMENT: 0-10

## 2023-09-25 ASSESSMENT — PAIN SCALES - GENERAL: PAINLEVEL_OUTOF10: 8
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
